# Patient Record
Sex: FEMALE | Race: WHITE | Employment: OTHER | URBAN - METROPOLITAN AREA
[De-identification: names, ages, dates, MRNs, and addresses within clinical notes are randomized per-mention and may not be internally consistent; named-entity substitution may affect disease eponyms.]

---

## 2017-05-15 ENCOUNTER — HOSPITAL ENCOUNTER (OUTPATIENT)
Dept: CT IMAGING | Age: 77
Discharge: HOME | End: 2017-05-15
Payer: SELF-PAY

## 2017-05-15 DIAGNOSIS — Z13.6 SCREENING FOR ISCHEMIC HEART DISEASE: ICD-10-CM

## 2017-05-15 PROCEDURE — 75571 CT HRT W/O DYE W/CA TEST: CPT

## 2017-05-23 ENCOUNTER — TELEPHONE (OUTPATIENT)
Dept: CARDIAC REHAB | Age: 77
End: 2017-05-23

## 2017-05-23 NOTE — TELEPHONE ENCOUNTER
Called patient to review CT scan results. Verified patient's date of birth. Discussed results of CT scan. Her calcium score is 0. This corresponds to no plaque noted in the coronary arteries. Her risk for a heart attack is very low. The chance of patient developing heart disease at this point is less than 1%. Patient verbalized understanding of results. Will fax report to his/her PCP, Miriam Martínez.

## 2019-05-05 PROBLEM — C50.919 MALIGNANT NEOPLASM OF BREAST IN FEMALE, ESTROGEN RECEPTOR NEGATIVE (HCC): Status: ACTIVE | Noted: 2019-05-05

## 2019-05-05 PROBLEM — Z17.1 MALIGNANT NEOPLASM OF BREAST IN FEMALE, ESTROGEN RECEPTOR NEGATIVE (HCC): Status: ACTIVE | Noted: 2019-05-05

## 2019-05-05 PROBLEM — M85.88 OSTEOPENIA OF SPINE: Status: ACTIVE | Noted: 2019-05-05

## 2019-05-05 RX ORDER — MELOXICAM 15 MG/1
1 TABLET ORAL DAILY
Refills: 0 | COMMUNITY
Start: 2019-03-25

## 2019-05-05 RX ORDER — LEVOTHYROXINE SODIUM 50 UG/1
50 TABLET ORAL DAILY
COMMUNITY

## 2019-05-07 ENCOUNTER — OFFICE VISIT (OUTPATIENT)
Dept: INTERNAL MEDICINE CLINIC | Age: 79
End: 2019-05-07

## 2019-05-07 VITALS
OXYGEN SATURATION: 96 % | DIASTOLIC BLOOD PRESSURE: 65 MMHG | SYSTOLIC BLOOD PRESSURE: 113 MMHG | BODY MASS INDEX: 22.08 KG/M2 | WEIGHT: 120 LBS | HEIGHT: 62 IN | RESPIRATION RATE: 18 BRPM | TEMPERATURE: 97.6 F | HEART RATE: 75 BPM

## 2019-05-07 DIAGNOSIS — E03.9 ACQUIRED HYPOTHYROIDISM: ICD-10-CM

## 2019-05-07 DIAGNOSIS — E78.5 HYPERLIPIDEMIA, UNSPECIFIED HYPERLIPIDEMIA TYPE: ICD-10-CM

## 2019-05-07 DIAGNOSIS — R39.9 LOWER URINARY TRACT SYMPTOMS (LUTS): ICD-10-CM

## 2019-05-07 DIAGNOSIS — Z12.11 SCREENING FOR COLON CANCER: ICD-10-CM

## 2019-05-07 DIAGNOSIS — Z17.1 MALIGNANT NEOPLASM OF RIGHT BREAST IN FEMALE, ESTROGEN RECEPTOR NEGATIVE, UNSPECIFIED SITE OF BREAST (HCC): ICD-10-CM

## 2019-05-07 DIAGNOSIS — C50.911 MALIGNANT NEOPLASM OF RIGHT BREAST IN FEMALE, ESTROGEN RECEPTOR NEGATIVE, UNSPECIFIED SITE OF BREAST (HCC): ICD-10-CM

## 2019-05-07 DIAGNOSIS — M85.88 OSTEOPENIA OF SPINE: Primary | ICD-10-CM

## 2019-05-07 DIAGNOSIS — R07.89 ATYPICAL CHEST PAIN: ICD-10-CM

## 2019-05-07 DIAGNOSIS — K22.70 BARRETT'S ESOPHAGUS WITHOUT DYSPLASIA: ICD-10-CM

## 2019-05-07 PROBLEM — R73.03 PREDIABETES: Status: ACTIVE | Noted: 2019-05-07

## 2019-05-07 PROBLEM — K80.20 CALCULUS OF GALLBLADDER WITHOUT CHOLECYSTITIS WITHOUT OBSTRUCTION: Status: ACTIVE | Noted: 2019-05-07

## 2019-05-07 PROBLEM — E78.49 OTHER HYPERLIPIDEMIA: Status: ACTIVE | Noted: 2019-05-07

## 2019-05-07 RX ORDER — LANOLIN ALCOHOL/MO/W.PET/CERES
1 CREAM (GRAM) TOPICAL DAILY
COMMUNITY

## 2019-05-07 RX ORDER — MULTIVITAMIN
1 TABLET ORAL DAILY
COMMUNITY

## 2019-05-07 RX ORDER — ACETAMINOPHEN 500 MG
1 TABLET ORAL DAILY
COMMUNITY

## 2019-05-07 RX ORDER — OMEPRAZOLE 20 MG/1
1 CAPSULE, DELAYED RELEASE ORAL 2 TIMES DAILY
Refills: 0 | COMMUNITY
Start: 2019-03-24

## 2019-05-07 NOTE — PROGRESS NOTES
HPI:  
 
This lady presents to the office for transfer of care and brought in the a medical summary sheet from the old practice which was used as template for her medical history. Her medications were reviewed and reconciled. She reports evaluation by Dr. Bakari Walsh for right knee pain prior to her overseas trip and was advised she had a degenerative meniscal tear which is being treated conservatively. She was able to walk during her vacation without difficulty. She is using Mobic for pain. 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 Gerald Naqvi and advised her to switch from Ranitidine to Omeprazole. There was concern about vitamin and nutrient malabsorption with prolonged use and was advised to take calcium supplements. I told her that it would be best for her to get calcium from her diet in light of NIH study showing increased CV risk with use of calcium supplements Her GYN screening is current through Dr. Isaura Eddy and continues to be followed by Dr. Deshaun Chamberlain at [facility] and Dr. Latoya Winters locally for her triple negative breast cancer diagnosed in  September 2013. This was treated with right breast lumpectomy with oncoplasty and SNL followed by XRT and TAC with treatment completed in July 2014. She has documented HLP with high ACC/AHA risk score but declined statin therapy. CAC scoring could not be scheduled because she has exceeded age limit for this testing although she has evidence for mild LICA plaquing and mild ischemic white matter changes. She denies any exertional chest pain but has occasional fluttering in her chest with exertion. She has osteopenia and is taking D3 supplements and exercising regularly. Her last DEXA showed further decline in BMD of her hip with follow up recommended in 2 years. She is on thyroid replacement for hypothyroidism and last TSH in April 2018 was 3.02. I cannot find any record of prior TPO antibody testing. She continues to work as a  and claims that her memory continues to serve her well. The last Folstein testing on file from 2018 was normal. 
 
Past Medical History:  
Diagnosis Date  Ramos esophagus   
 without dysplasia  GERD (gastroesophageal reflux disease)  Hypothyroidism  Macrocytosis without anemia  Malignant neoplasm of breast in female, estrogen receptor negative ([facility] [address] 2019 Triple negative breast cancer right  Osteopenia of spine 2019 Past Surgical History:  
Procedure Laterality Date  HX APPENDECTOMY  HX BREAST LUMPECTOMY  HX  SECTION Current Outpatient Medications Medication Sig  levothyroxine (SYNTHROID) 50 mcg tablet Take 50 mcg by mouth daily.  meloxicam (MOBIC) 15 mg tablet Take 1 Tab by mouth daily. No current facility-administered medications for this visit. Allergies and Intolerances:  
No Known Allergies Family History:  
Family History Problem Relation Age of Onset  Heart Disease Mother  No Known Problems Father Social History: She  reports that she has never smoked. She has never used smokeless tobacco.  
Social History Substance and Sexual Activity Alcohol Use Yes Immunization History:   Pneumovax, Prevnar, Shingrix, Flu  
 
Diet:                               Sensible Exercise:                        Elliptical, Biking, Weight Home Environment:       3 story home Occupational Risk:         No hazards Review of Systems Constitutional: Negative for chills, fever and weight loss. HENT: Negative for hearing loss. Respiratory: Negative for cough and shortness of breath. Cardiovascular: Positive for palpitations. Gastrointestinal: Negative for blood in stool, heartburn and melena. Genitourinary: Positive for frequency. Musculoskeletal: Positive for joint pain. Skin: Negative for rash. Neurological: Negative for dizziness and headaches. Endo/Heme/Allergies: Positive for environmental allergies. Does not bruise/bleed easily. Psychiatric/Behavioral: The patient does not have insomnia. Physical:  
Visit Vitals /65 (BP 1 Location: Left arm, BP Patient Position: Sitting) Pulse 75 Temp 97.6 °F (36.4 °C) (Oral) Resp 18 Ht 5' 2\" (1.575 m) Wt 120 lb (54.4 kg) SpO2 96% Breastfeeding? Unknown BMI 21.95 kg/m² Physical Exam  
Constitutional:  
Highly anxious lady HENT:  
Right Ear: External ear normal.  
Left Ear: External ear normal.  
Mouth/Throat: Oropharynx is clear and moist.  
Eyes: Conjunctivae are normal. No scleral icterus. Neck: No JVD present. No thyromegaly present. Cardiovascular: Normal rate, regular rhythm and normal heart sounds. Exam reveals no gallop. No murmur heard. Pulmonary/Chest: Breath sounds normal. She has no wheezes. She has no rales. Abdominal: Soft. Bowel sounds are normal. She exhibits no mass. There is no tenderness. Musculoskeletal: She exhibits no edema or deformity. Mild instability to medial and lateral stress testing right knee, tight hip flexors Lymphadenopathy:  
  She has no cervical adenopathy. Neurological: She is alert. She has intact cranial nerves. She displays no tremor. Gait normal.  
Skin: Skin is warm. No cyanosis or ulcer, nail polish obscured details Vitals reviewed. Review of Data:   EKG with background artifact, NSR without Q waves or ST changes Labs: 
No visits with results within 3 Month(s) from this visit. Latest known visit with results is: No results found for any previous visit. Impression/Plan: 
 Patient Active Problem List  
Diagnosis  Code GERD (silent) with Ramos's metaplasia PPI, endoscopic surveillance per Dr. Berenice Hui K21.9, K22.70 Atypical chest discomfort Arrange NST through Dr. Deshawn Villa R07.89  Hyperlipidemia with mild atherosclerotic burden with statin declined Abide by patient's decision, consider use of Mozella Gil E78.5  Malignant neoplasm of breast, triple negative, S/P lumpectomy, SNL (1/2 +), XRT and TAC Surveillance and management per Dr. Clarisse Mckeon and Dr. Rois Posada 
 C50.919, Z17.1  Osteopenia of spine with further decline BMD Check Vitamin D level, weight bearing exercise, DEXA 2020, limit ETOH 
 M85.88  Hypothyroidism Avoid overreplacment E03.9  Meniscal tear right knee, degenerative type Management per Dr. Adelina Crawford, knee brace K22.70  Macrocytosis without anemia likely from ETOH use Limit ETOH use, check B12 D75.89  Screening/Wellness Cologard requested, Shingrix recommended E03.9 Donnell Oreilly MD

## 2019-05-07 NOTE — PROGRESS NOTES
Chief Complaint Patient presents with  Thyroid Problem 1. Have you been to the ER, urgent care clinic since your last visit? Hospitalized since your last visit? No 
 
2. Have you seen or consulted any other health care providers outside of the [address] [place] [address] since your last visit? Include any pap smears or colon screening.  No

## 2019-05-15 DIAGNOSIS — R07.89 ATYPICAL CHEST PAIN: Primary | ICD-10-CM

## 2019-05-15 LAB
25(OH)D3 SERPL-MCNC: 46.7 NG/ML (ref 32–100)
A-G RATIO,AGRAT: 1.8 RATIO (ref 1.1–2.6)
ALBUMIN SERPL-MCNC: 4.7 G/DL (ref 3.5–5)
ALP SERPL-CCNC: 88 U/L (ref 40–120)
ALT SERPL-CCNC: 16 U/L (ref 5–40)
ANION GAP SERPL CALC-SCNC: 17 MMOL/L
AST SERPL W P-5'-P-CCNC: 22 U/L (ref 10–37)
BILIRUB SERPL-MCNC: 0.9 MG/DL (ref 0.2–1.2)
BILIRUB UR QL: NEGATIVE
BUN SERPL-MCNC: 14 MG/DL (ref 6–22)
CALCIUM SERPL-MCNC: 10 MG/DL (ref 8.4–10.5)
CHLORIDE SERPL-SCNC: 98 MMOL/L (ref 98–110)
CHOLEST SERPL-MCNC: 226 MG/DL (ref 110–200)
CO2 SERPL-SCNC: 26 MMOL/L (ref 20–32)
CREAT SERPL-MCNC: 0.6 MG/DL (ref 0.8–1.4)
ERYTHROCYTE [DISTWIDTH] IN BLOOD BY AUTOMATED COUNT: 12.5 % (ref 10–15.5)
GFRAA, 66117: >60
GFRNA, 66118: >60
GLOBULIN,GLOB: 2.6 G/DL (ref 2–4)
GLUCOSE SERPL-MCNC: 101 MG/DL (ref 70–99)
GLUCOSE UR QL: NEGATIVE MG/DL
HCT VFR BLD AUTO: 44.2 % (ref 35.1–48.3)
HDLC SERPL-MCNC: 2.4 MG/DL (ref 0–5)
HDLC SERPL-MCNC: 95 MG/DL (ref 40–59)
HGB BLD-MCNC: 14.6 G/DL (ref 11.7–16.1)
HGB UR QL STRIP: NEGATIVE
KETONES UR QL STRIP.AUTO: NEGATIVE MG/DL
LDLC SERPL CALC-MCNC: 117 MG/DL (ref 50–99)
LEUKOCYTE ESTERASE: NEGATIVE
MCH RBC QN AUTO: 32 PG (ref 26–34)
MCHC RBC AUTO-ENTMCNC: 33 G/DL (ref 31–36)
MCV RBC AUTO: 96 FL (ref 80–95)
NITRITE UR QL STRIP.AUTO: NEGATIVE
PH UR STRIP: 6 PH (ref 5–8)
PLATELET # BLD AUTO: 223 K/UL (ref 140–440)
PMV BLD AUTO: 11.8 FL (ref 9–13)
POTASSIUM SERPL-SCNC: 4.7 MMOL/L (ref 3.5–5.5)
PROT SERPL-MCNC: 7.3 G/DL (ref 6.2–8.1)
PROT UR QL STRIP: NEGATIVE MG/DL
RBC # BLD AUTO: 4.63 M/UL (ref 3.8–5.2)
RBC #/AREA URNS HPF: NEGATIVE /HPF
SODIUM SERPL-SCNC: 141 MMOL/L (ref 133–145)
SP GR UR: 1.01 (ref 1–1.03)
TRIGL SERPL-MCNC: 71 MG/DL (ref 40–149)
TSH SERPL DL<=0.005 MIU/L-ACNC: 2.15 MCU/ML (ref 0.27–4.2)
UROBILINOGEN UR STRIP-MCNC: <2 MG/DL
VLDLC SERPL CALC-MCNC: 14 MG/DL (ref 8–30)
WBC # BLD AUTO: 7.1 K/UL (ref 4–11)
WBC URNS QL MICRO: NEGATIVE /HPF (ref 0–2)

## 2019-05-17 ENCOUNTER — TELEPHONE (OUTPATIENT)
Dept: INTERNAL MEDICINE CLINIC | Age: 79
End: 2019-05-17

## 2019-05-17 NOTE — TELEPHONE ENCOUNTER
Pt calling about the Nuclear stress test. Do you want her to have it done. If yes she wants Dr. Ndiaye Power office. We talked about this there was no order placed.

## 2019-06-03 DIAGNOSIS — R07.89 ATYPICAL CHEST PAIN: ICD-10-CM

## 2019-08-06 ENCOUNTER — TELEPHONE (OUTPATIENT)
Dept: INTERNAL MEDICINE CLINIC | Age: 79
End: 2019-08-06

## 2019-08-06 DIAGNOSIS — F41.9 ANXIETY: Primary | ICD-10-CM

## 2019-08-06 RX ORDER — LORAZEPAM 0.5 MG/1
0.5 TABLET ORAL
Qty: 30 TAB | Refills: 0 | Status: SHIPPED | OUTPATIENT
Start: 2019-08-06

## 2019-08-26 ENCOUNTER — TELEPHONE (OUTPATIENT)
Dept: INTERNAL MEDICINE CLINIC | Age: 79
End: 2019-08-26

## 2019-08-26 NOTE — TELEPHONE ENCOUNTER
Pt called requesting a recommendation to who to see a VBIM, she stated she has been having back issues for the past 2-3 weeks and would like to be able to see a specialist. She would like to schedule an appointment to see Dr. Tita Lan but he is completely booked until his leave date. Please advise.

## 2019-08-29 ENCOUNTER — HOSPITAL ENCOUNTER (OUTPATIENT)
Dept: LAB | Age: 79
Discharge: HOME | End: 2019-08-29
Payer: MEDICARE

## 2019-08-29 ENCOUNTER — OFFICE VISIT (OUTPATIENT)
Dept: INTERNAL MEDICINE CLINIC | Age: 79
End: 2019-08-29

## 2019-08-29 VITALS
OXYGEN SATURATION: 98 % | WEIGHT: 118 LBS | HEIGHT: 62 IN | BODY MASS INDEX: 21.71 KG/M2 | TEMPERATURE: 97.8 F | RESPIRATION RATE: 18 BRPM | DIASTOLIC BLOOD PRESSURE: 66 MMHG | SYSTOLIC BLOOD PRESSURE: 126 MMHG | HEART RATE: 66 BPM

## 2019-08-29 DIAGNOSIS — G89.29 CHRONIC BILATERAL LOW BACK PAIN WITHOUT SCIATICA: Primary | ICD-10-CM

## 2019-08-29 DIAGNOSIS — M54.50 CHRONIC LEFT-SIDED LOW BACK PAIN WITHOUT SCIATICA: ICD-10-CM

## 2019-08-29 DIAGNOSIS — G89.29 CHRONIC LEFT-SIDED LOW BACK PAIN WITHOUT SCIATICA: Primary | ICD-10-CM

## 2019-08-29 DIAGNOSIS — M54.50 CHRONIC BILATERAL LOW BACK PAIN WITHOUT SCIATICA: Primary | ICD-10-CM

## 2019-08-29 DIAGNOSIS — M54.50 CHRONIC LEFT-SIDED LOW BACK PAIN WITHOUT SCIATICA: Primary | ICD-10-CM

## 2019-08-29 DIAGNOSIS — G89.29 CHRONIC LEFT-SIDED LOW BACK PAIN WITHOUT SCIATICA: ICD-10-CM

## 2019-08-29 LAB
APPEARANCE UR: CLEAR
BACTERIA URNS QL MICRO: ABNORMAL /HPF
BILIRUB UR QL: NEGATIVE
COLOR UR: YELLOW
EPITH CASTS URNS QL MICRO: ABNORMAL /LPF (ref 0–5)
GLUCOSE UR STRIP.AUTO-MCNC: NEGATIVE MG/DL
HGB UR QL STRIP: NEGATIVE
KETONES UR QL STRIP.AUTO: NEGATIVE MG/DL
LEUKOCYTE ESTERASE UR QL STRIP.AUTO: NEGATIVE
NITRITE UR QL STRIP.AUTO: NEGATIVE
PH UR STRIP: 6 [PH] (ref 5–8)
PROT UR STRIP-MCNC: NEGATIVE MG/DL
RBC #/AREA URNS HPF: ABNORMAL /HPF (ref 0–5)
SP GR UR REFRACTOMETRY: 1.01 (ref 1–1.03)
UROBILINOGEN UR QL STRIP.AUTO: 0.2 EU/DL (ref 0.2–1)
WBC URNS QL MICRO: ABNORMAL /HPF (ref 0–4)

## 2019-08-29 PROCEDURE — 81001 URINALYSIS AUTO W/SCOPE: CPT

## 2019-08-29 NOTE — PROGRESS NOTES
HPI:     This lady presents to the office with month long history of left lower back pain that is intermittent and provoked by certain movement such as getting up from a sitting position. This is relieved by bending over at the waist with ankles crossed. The pain is non radiating and not associated with paresthesias or leg weakness. The pain improves with ice compress and Advil. Her biggest concern is possibility of osseous metastases from underlying breast cancer. Past Medical History:   Diagnosis Date    Ramos esophagus     without dysplasia    GERD (gastroesophageal reflux disease)     Hypothyroidism     Macrocytosis without anemia     Malignant neoplasm of breast in female, estrogen receptor negative ([facility] [address] 2019    Triple negative breast cancer right     Osteopenia of spine 2019    Prediabetes 2019     Past Surgical History:   Procedure Laterality Date    HX APPENDECTOMY      HX BREAST LUMPECTOMY      HX  SECTION       Current Outpatient Medications   Medication Sig    LORazepam (ATIVAN) 0.5 mg tablet Take 1 Tab by mouth nightly as needed for Anxiety. Max Daily Amount: 0.5 mg.    cinnamon bark 500 mg cap Take 1 Cap by mouth daily.  cyanocobalamin (VITAMIN B-12) 1,000 mcg tablet Take 1 Tab by mouth daily.  cholecalciferol (VITAMIN D3) 2,000 unit cap capsule Take 1 Cap by mouth daily.  omeprazole (PRILOSEC) 20 mg capsule Take 1 Cap by mouth two (2) times a day.  levothyroxine (SYNTHROID) 50 mcg tablet Take 50 mcg by mouth daily.  meloxicam (MOBIC) 15 mg tablet Take 1 Tab by mouth daily. No current facility-administered medications for this visit. Allergies and Intolerances:   No Known Allergies     Family History:   Family History   Problem Relation Age of Onset    Heart Disease Mother 80    Other Father 80    Diabetes Daughter     No Known Problems Brother     No Known Problems Brother      Social History:   She  reports that she has never smoked. She has never used smokeless tobacco.   Social History     Substance and Sexual Activity   Alcohol Use Yes       Physical:   Visit Vitals  /66   Pulse 66   Temp 97.8 °F (36.6 °C) (Oral)   Resp 18   Ht 5' 2\" (1.575 m)   Wt 118 lb (53.5 kg)   SpO2 98%   BMI 21.58 kg/m²          Physical Exam   Constitutional: She is well-developed, well-nourished, and in no distress. Neck: No JVD present. Cardiovascular: Regular rhythm. Pulses:       Dorsalis pedis pulses are 2+ on the right side, and 2+ on the left side. Posterior tibial pulses are 2+ on the right side, and 2+ on the left side. Musculoskeletal:        Back:    Lymphadenopathy:     She has no cervical adenopathy. Neurological: She has normal strength. Gait normal.   Reflex Scores:       Patellar reflexes are 0 on the right side and 1+ on the left side. Achilles reflexes are 1+ on the right side and 1+ on the left side. Review of Data:   LS spine with scoliosis and DJD  Labs:  No visits with results within 3 Month(s) from this visit.    Latest known visit with results is:   Orders Only on 05/15/2019   Component Date Value Ref Range Status    WBC 05/15/2019 7.1  4.0 - 11.0 K/uL Final    RBC 05/15/2019 4.63  3.80 - 5.20 M/uL Final    HGB 05/15/2019 14.6  11.7 - 16.1 g/dL Final    HCT 05/15/2019 44.2  35.1 - 48.3 % Final    MCV 05/15/2019 96* 80 - 95 fL Final    MCH 05/15/2019 32  26 - 34 pg Final    MCHC 05/15/2019 33  31 - 36 g/dL Final    RDW 05/15/2019 12.5  10.0 - 15.5 % Final    PLATELET 93/56/5413 579  140 - 440 K/uL Final    MPV 05/15/2019 11.8  9.0 - 13.0 fL Final    Specific Gravity 05/15/2019 1.006  1.005 - 1.03 Final    pH (UA) 05/15/2019 6.0  5.0 - 8.0 pH Final    Protein 05/15/2019 Negative  Negative, mg/dL Final    Glucose 05/15/2019 Negative  Negative mg/dL Final    Ketone 05/15/2019 Negative  Negative, mg/dL Final    Bilirubin 05/15/2019 Negative  Negative Final    Blood 05/15/2019 Negative  Negative Final    Nitrites 05/15/2019 Negative  Negative Final    Leukocyte Esterase 05/15/2019 Negative  Negative Final    Urobilinogen 05/15/2019 <2.0  <2.0 mg/dL Final    WBC 05/15/2019 Negative  0 - 2 /hpf Final    RBC 05/15/2019 Negative  Negative, /hpf Final    TSH 05/15/2019 2.15  0.27 - 4.20 mcU/mL Final    Glucose 05/15/2019 101* 70 - 99 mg/dL Final    BUN 05/15/2019 14  6 - 22 mg/dL Final    Creatinine 05/15/2019 0.6* 0.8 - 1.4 mg/dL Final    Sodium 05/15/2019 141  133 - 145 mmol/L Final    Potassium 05/15/2019 4.7  3.5 - 5.5 mmol/L Final    Chloride 05/15/2019 98  98 - 110 mmol/L Final    CO2 05/15/2019 26  20 - 32 mmol/L Final    AST (SGOT) 05/15/2019 22  10 - 37 U/L Final    ALT (SGPT) 05/15/2019 16  5 - 40 U/L Final    Alk.  phosphatase 05/15/2019 88  40 - 120 U/L Final    Bilirubin, total 05/15/2019 0.9  0.2 - 1.2 mg/dL Final    Calcium 05/15/2019 10.0  8.4 - 10.5 mg/dL Final    Protein, total 05/15/2019 7.3  6.2 - 8.1 g/dL Final    Albumin 05/15/2019 4.7  3.5 - 5.0 g/dL Final    A-G Ratio 05/15/2019 1.8  1.1 - 2.6 ratio Final    Globulin 05/15/2019 2.6  2.0 - 4.0 g/dL Final    Anion gap 05/15/2019 17.0  mmol/L Final    GFRAA 05/15/2019 >60.0  >60.0 Final    GFRNA 05/15/2019 >60.0  >60.0 Final    Triglyceride 05/15/2019 71  40 - 149 mg/dL Final    HDL Cholesterol 05/15/2019 95* 40 - 59 mg/dL Final    Cholesterol, total 05/15/2019 226* 110 - 200 mg/dL Final    CHOLESTEROL/HDL 05/15/2019 2.4  0.0 - 5.0 Final    LDL, calculated 05/15/2019 117* 50 - 99 mg/dL Final    VLDL, calculated 05/15/2019 14  8 - 30 mg/dL Final    VITAMIN D, 25-HYDROXY 05/15/2019 46.7  32.0 - 100.0 ng/mL Final         Impression/{Plan:   Patient Active Problem List   Diagnosis  Code    Back pain likely myofascial Awaiting official LS spine report, bone scan, U/A, Advil, Ice, Stretching M54.5       Shay Perez MD

## 2019-08-29 NOTE — PROGRESS NOTES
Chief Complaint   Patient presents with    Back Pain     1. Have you been to the ER, urgent care clinic since your last visit? Hospitalized since your last visit? No    2. Have you seen or consulted any other health care providers outside of the [address] [place] [address] since your last visit? Include any pap smears or colon screening.  No

## 2019-08-30 ENCOUNTER — TELEPHONE (OUTPATIENT)
Dept: INTERNAL MEDICINE CLINIC | Age: 79
End: 2019-08-30

## 2020-02-20 ENCOUNTER — HOSPITAL ENCOUNTER (OUTPATIENT)
Dept: PHYSICAL THERAPY | Age: 80
Discharge: HOME | End: 2020-02-20
Payer: MEDICARE

## 2020-02-20 PROCEDURE — 97535 SELF CARE MNGMENT TRAINING: CPT

## 2020-02-20 PROCEDURE — 97161 PT EVAL LOW COMPLEX 20 MIN: CPT

## 2020-02-20 PROCEDURE — 97110 THERAPEUTIC EXERCISES: CPT

## 2020-02-20 NOTE — PROGRESS NOTES
[facility] PHYSICAL THERAPY AT [facility] [address] [place] [facility] [address] - Phone: [phone]  Fax: [phone] / [address] [facility]  Patient Name: Nitin Tate : 1940   Medical   Diagnosis: Low back pain [M54.5] Treatment Diagnosis: LBP   Onset Date:      Referral Source: Sravanthi Block MD [place] [facility]): 2020   Prior Hospitalization: See medical history Provider #: [id number]   Prior Level of Function: Symptom free, working out regularly at the gym   Comorbidities: Thyroid problems, Breast Cancer in 2013, Scoliosis   Medications: Verified on Patient Summary List     The Plan of Care and following information is based on the information from the initial evaluation.   ===========================================================================================  Assessment / key information:   Nitin Tate is a 78 y.o.  yo female with Dx of Low back pain [M54.5]. She currently rates LBP as 7/10 at worst, 3/10 at best, primarily located at L lower thoracic and lumbar region. She complains of difficulty and increase pain with amb > 5 min, standing> 10 min and during transfers. She reports no known trauma, unknown etiology of symptoms. Objective Findings:  Lumbar ROM: Flx  =24 inch from floor with reduction in symptoms, Ext =25% with pain, Lat Flx; R =reduction in symptoms, 50%L = 25% and increase symptoms, Rot: R =functional and nonpainful, L painful at onset of movement and 25%. Manual Muscle Testing: Limited hip flexion L 4/5, hip abd 3+/5 and core strength at fair with pain during testing. Special Test: SLS L with pain and inability to hold > 3 sec, R at 10 sec. Slump Test:  -, SLR -. Pt PIVM testing indicates hypomobility lower thoracic and lumbar L at Gr 2. Pt alignment: R lumbar rotation and L SB. Pt muscle length testing: + for hip flexor, L QL and piriformis restrictions.  Pt Tuberculin Skin Test   WHAT YOU SHOULD KNOW:   · A tuberculin skin test is done to see if you are infected with the germ that causes tuberculosis (TB)  Tuberculin is a liquid that caregivers inject into the skin of your arm  Your skin will react to tuberculin if you are infected  Tuberculosis is a serious infection that usually starts in the lungs  TB germs are easily spread from one person to another through the air  The germs can live in your body a long time without making you sick  This is called latent TB  Latent TB can develop into active TB if it is not treated  · A TB skin test may be done if you live or have spent time with someone who has TB  The test is often done if you have signs of active TB  For example, you cough up blood or have night sweats  You may need to be tested if you share needles to inject drugs  A TB test may also be needed as screening for a job or if your immune system is weak from disease  AFTER YOU LEAVE:   After the test:   · Return in 2 to 3 days: Your skin must be checked 2 to 3 days after the test  You will need another TB skin test if you do not come back within 3 days  · Watch for signs of allergic reaction:  Some people have an allergic reaction to tuberculin  Seek help immediately if you have any symptoms of allergic reaction, such as hives or swelling  What the test results mean:  The TB skin test can only show that you were infected with the germ that causes tuberculosis  You will need more tests to learn if you have latent or active TB  The most common tests are chest x-rays and sputum samples  · Positive test:  Your test is positive if the area around the skin test is raised or hard  Your test can be positive even if you do not have active TB  This would happen if you received a BCG vaccination for TB  · Negative test:  Your test is negative if there is no change to your skin  Your test can be negative even if you do have TB   Your immune system may be too instructed in HEP and will f/u in clinic for PT.      ==================================================================================  Eval Complexity: History MEDIUM  Complexity : 1-2 comorbidities / personal factors will impact the outcome/ POC ;  Examination  MEDIUM Complexity : 3 Standardized tests and measures addressing body structure, function, activity limitation and / or participation in recreation ; Presentation LOW Complexity : Stable, uncomplicated ;  Decision Making MEDIUM Complexity : FOTO score of 26-74; Overall Complexity LOW   Problem List: pain affecting function, decrease ROM, decrease strength, impaired gait/ balance, decrease ADL/ functional abilitiies, decrease activity tolerance, decrease flexibility/ joint mobility and decrease transfer abilities   Treatment Plan may include any combination of the following: Therapeutic exercise, Therapeutic activities, Neuromuscular re-education, Physical agent/modality, Manual therapy, Patient education and Self Care training  Patient / Family readiness to learn indicated by: asking questions, trying to perform skills and interest  Persons(s) to be included in education: patient (P)  Barriers to Learning/Limitations: None  Measures taken, if barriers to learning:    Patient Goal (s): Less pain and able to work with less limitations     Rehabilitation Potential: good   Short Term Goals: To be accomplished in  2  weeks:  1. Pt will be independent with HEP for strengthening, stretching program to improve ability to perform ADLs. 2. Pt will report reduction in max pain <3/10 to assist with return to full work activity. 3. Improve FOTO score by 5 points to improve function with squatting, sit<>stand and amb. 4.  Pt will demo understanding of correct body mechanics and postural re-ed to improve symptoms.  Long Term Goals: To be accomplished in  4  Weeks:  1. Pt will demo understanding of and compliance with DC HEP to improve symptoms.   2.  Pt will weak to react to the tuberculin, or your infection may be too recent  Contact your primary healthcare provider if:   · You have questions or concerns about the test or about tuberculosis  Seek care immediately or call 911 if:  · You have chest pain  · You have a mild cough that gets worse  You may cough up white, yellow blood-streaked sputum  · You have blisters, ulcers, or your skin has turned black in the injection area  · Your skin is itchy, or you have a rash or hives that are spreading  · Your face is red and swollen  · Your mouth is swollen, your throat feels tight, or you have trouble breathing  © 2014 [address] is for End User's use only and may not be sold, redistributed or otherwise used for commercial purposes  All illustrations and images included in CareNotes® are the copyrighted property of [employer]  or [employer]  The above information is an  only  It is not intended as medical advice for individual conditions or treatments  Talk to your doctor, nurse or pharmacist before following any medical regimen to see if it is safe and effective for you  demonstrate ability to perform amb in community 30 min with 3/10 LBP. 3.  Pt will report +4 on GROC scale to indicate improved function and be prepared to DC to HEP. Frequency / Duration:   Patient to be seen  2 times per week for 4weeks:  Patient / Caregiver education and instruction: self care, activity modification and exercises    Therapist Signature: Quirino Hassan PT Date: 3/74/2736   Certification Period: 2/20/20-3/19/20 Time: 9:12 AM   ===========================================================================================  I certify that the above Physical Therapy Services are being furnished while the patient is under my care. I agree with the treatment plan and certify that this therapy is necessary. Physician Signature:        Date:       Time:     Please sign and return to [facility] or you may fax the signed copy to [phone]. Thank you.

## 2020-02-20 NOTE — PROGRESS NOTES
PHYSICAL THERAPY - DAILY TREATMENT NOTE    Patient Name: Olivia Feng        Date: 2020  : 1940   YES Patient  Verified  Visit #:   1   of   8  Insurance: Payor: Rosette Fortune / Plan: [place] MED[place]ARE PART A & B / Product Type: Medicare /      In time: 910 Out time:    Total Treatment Time: 58     Medicare Time Tracking (below)   Total Timed Codes (min):  30 1:1 Treatment Time:  30     TREATMENT AREA =  Low back pain [M54.5]    SUBJECTIVE                                                         See IE            OBJECTIVE       15 min Therapeutic Exercise:  []  See flow sheet   Rationale:      increase ROM and increase strength to improve the patients ability to perform ADLs, IADLs with less pain      15 min Self-Care      Rationale:     Improve posture and body mechanics to improve the patients ability to perform ADLs, IADLs, work with less pain        Billed With/As:   [x] TE   [] TA   [] Neuro   [x] Self Care Patient Education: [x] Review HEP    [] Progressed/Changed HEP based on:   [] positioning   [] body mechanics   [] transfers   [] heat/ice application    [] other:        Other Objective/Functional Measures:                                   See IE       ASSESSMENT    [x]  See Initial Evaluation     PLAN    []  Upgrade activities as tolerated YES Continue plan of care   []  Discharge due to :    []  Other: Pt will return for follow up and to initiate POC     Therapist: Silviano Lopez PT, DPT, MTC    Date: 2020 Time: 9:12 AM

## 2020-02-24 ENCOUNTER — HOSPITAL ENCOUNTER (OUTPATIENT)
Dept: PHYSICAL THERAPY | Age: 80
Discharge: HOME | End: 2020-02-24
Payer: MEDICARE

## 2020-02-24 PROCEDURE — 97110 THERAPEUTIC EXERCISES: CPT

## 2020-02-24 PROCEDURE — 97140 MANUAL THERAPY 1/> REGIONS: CPT

## 2020-02-24 NOTE — PROGRESS NOTES
PHYSICAL THERAPY - DAILY TREATMENT NOTE     Patient Name: Tomi Peraza        Date: 2020  : 1940   YES Patient  Verified  Visit #:   2   of   8  Insurance: Payor: Marlena Borne / Plan: [place] MED[place]ARE PART A & B / Product Type: Medicare /      In time: 7405 Out time: 105   Total Treatment Time: 60     Medicare/BCBS Time Tracking (below)   Total Timed Codes (min):  50 1:1 Treatment Time:  40     TREATMENT AREA =  Low back pain [M54.5]    SUBJECTIVE    Pain Level (on 0 to 10 scale):  2  / 10   Medication Changes/New allergies or changes in medical history, any new surgeries or procedures? NO    If yes, update Summary List   Subjective Functional Status/Changes:  []  No changes reported       Functional improvements: Did her exercises, but not noticing improvement yet in function.   Functional impairments: work, ADLs, IADLs with  pain           OBJECTIVE  Modalities Rationale:     decrease pain to improve patient's ability to perform ADLs, IADLs with less pain        min [] Estim, type/location:                                      []  att     []  unatt     []  w/US     []  w/ice    []  w/heat    min []  Mechanical Traction: type/lbs                   []  pro   []  sup   []  int   []  cont    []  before manual    []  after manual    min []  Ultrasound, settings/location:      min []  Iontophoresis w/ dexamethasone, location:                                               []  take home patch       []  in clinic   10 min []  Ice     [x]  Heat    location/position: Supine to lumbar    min []  Vasopneumatic Device, press/temp:     min []  Other:    [x] Skin assessment post-treatment (if applicable):    [x]  intact    [x]  redness- no adverse reaction     []redness - adverse reaction:      35/25 min Therapeutic Exercise:  [x]  See flow sheet   Rationale:      increase ROM, increase strength and improve coordination to improve the patients ability to perform ADLs, IADLs with less pain       15 min Manual Therapy: Technique:      [x] S/DTM []IASTM []PROM [] Passive Stretching   [x]manual TPR    []Jt manipulation:Gr I [] II []  III [] IV[] V[]  Treatment Area:  L QL, L lumbar paraspinals   Rationale:      decrease pain, increase ROM and increase tissue extensibility to improve patient's ability to perform ADLs, IADLs with less pain         Billed With/As:   [x] TE   [] TA   [] Neuro   [] Self Care Patient Education: [x] Review HEP    [] Progressed/Changed HEP based on:   [x] positioning   [x] body mechanics   [x] transfers   [] heat/ice application    [] other:        Other Objective/Functional Measures:    Initiated HEP and there ex. Post Treatment Pain Level (on 0 to 10) scale:   2  / 10     ASSESSMENT    Assessment/Changes in Function:     Good response to updated HEP. Pt with significant difficulty performing S/L hip abd with LBP and compensation. Modified to clam for HEP. []  See Progress Note/Recertification   Patient will continue to benefit from skilled PT services to modify and progress therapeutic interventions, address functional mobility deficits, address ROM deficits, address strength deficits, analyze and address soft tissue restrictions, analyze and cue movement patterns and analyze and modify body mechanics/ergonomics to attain remaining goals. Progress toward goals / Updated goals:  1. Pt will be independent with HEP for strengthening, stretching program to improve ability to perform ADLs--fair progress with min A for verbal and tactile cues.   2. Pt will report reduction in max pain <3/10 to assist with return to full work activity--fair progress with 5/10        PLAN    [x]  Upgrade activities as tolerated yes Continue plan of care   []  Discharge due to :    []  Other:      Therapist: Claudette Gray, PT    Date: 2/24/2020 Time: 12:12 PM     Future Appointments   Date Time Provider Deanna Yanez   2/28/2020  9:30 AM Domingo Fregoso, PT REHAB CENTER AT [facility]   3/2/2020 12:00 PM Domingo Fregoso, PT REHAB CENTER AT [facility]   3/6/2020 11:00 AM Cyrilla December, PT REHAB CENTER AT [facility]   3/9/2020 10:00 AM Cyrilla December, PT REHAB CENTER AT [facility]   3/13/2020  9:30 AM Cyrilla December, PT REHAB CENTER AT [facility]   3/18/2020 11:30 AM Cyrilla December, PT REHAB CENTER AT [facility]   3/23/2020 10:00 AM Cyrilla December, PT REHAB CENTER AT [facility]

## 2020-02-28 ENCOUNTER — HOSPITAL ENCOUNTER (OUTPATIENT)
Dept: PHYSICAL THERAPY | Age: 80
Discharge: HOME | End: 2020-02-28
Payer: MEDICARE

## 2020-02-28 PROCEDURE — 97140 MANUAL THERAPY 1/> REGIONS: CPT

## 2020-02-28 PROCEDURE — 97110 THERAPEUTIC EXERCISES: CPT

## 2020-02-28 NOTE — PROGRESS NOTES
PHYSICAL THERAPY - DAILY TREATMENT NOTE     Patient Name: Olivia Feng        Date: 2020  : 1940   YES Patient  Verified  Visit #:   3   of   8  Insurance: Payor: Rosette Tong / Plan: [place] MEDICARE PART A & B / Product Type: Medicare /      In time: 930 Out time:  1562   Total Treatment Time: 65     Medicare/BCBS Time Tracking (below)   Total Timed Codes (min):  55 1:1 Treatment Time:  55     TREATMENT AREA =  Low back pain [M54.5]    SUBJECTIVE    Pain Level (on 0 to 10 scale):  2  / 10   Medication Changes/New allergies or changes in medical history, any new surgeries or procedures? NO    If yes, update Summary List   Subjective Functional Status/Changes:  []  No changes reported       Functional improvements: Did her exercises since last visit but worked on bed vs floor to reduce pressure.   Functional impairments: Standing for work, ADLs, IADLs with  pain           OBJECTIVE  Modalities Rationale:     decrease pain to improve patient's ability to perform ADLs, IADLs with less pain   min [] Estim, type/location:                                      []  att     []  unatt     []  w/US     []  w/ice    []  w/heat    min []  Mechanical Traction: type/lbs                   []  pro   []  sup   []  int   []  cont    []  before manual    []  after manual    min []  Ultrasound, settings/location:      min []  Iontophoresis w/ dexamethasone, location:                                               []  take home patch       []  in clinic   10 min []  Ice     [x]  Heat    location/position: Supine to lumbar    min []  Vasopneumatic Device, press/temp:     min []  Other:    [x] Skin assessment post-treatment (if applicable):    [x]  intact    [x]  redness- no adverse reaction     []redness - adverse reaction:      40 min Therapeutic Exercise:  [x]  See flow sheet   Rationale:      increase ROM, increase strength and improve coordination to improve the patients ability to perform ADLs, IADLs with less pain 15 min Manual Therapy: Technique:      [x] S/DTM []IASTM []PROM [] Passive Stretching   [x]manual TPR    []Jt manipulation:Gr I [] II []  III [] IV[] V[]  Treatment Area:  B QL L>R, L lumbar paraspinals   Rationale:      decrease pain, increase ROM and increase tissue extensibility to improve patient's ability to perform ADLs, IADLs with less pain         Billed With/As:   [x] TE   [] TA   [] Neuro   [] Self Care Patient Education: [x] Review HEP    [] Progressed/Changed HEP based on:   [x] positioning   [x] body mechanics   [x] transfers   [] heat/ice application    [] other:        Other Objective/Functional Measures:    Advanced HEP and there ex with bridges, SLR and dead bug. Added BKFO with band and increased reps for core and LE strengthening. Post Treatment Pain Level (on 0 to 10) scale:   2  / 10     ASSESSMENT    Assessment/Changes in Function:     Pt with ability to perform pain free bridge and SLR. She was unable to perform at time of IEval, good progress. []  See Progress Note/Recertification   Patient will continue to benefit from skilled PT services to modify and progress therapeutic interventions, address functional mobility deficits, address ROM deficits, address strength deficits, analyze and address soft tissue restrictions, analyze and cue movement patterns and analyze and modify body mechanics/ergonomics to attain remaining goals. Progress toward goals / Updated goals:  1. Pt will be independent with HEP for strengthening, stretching program to improve ability to perform ADLs--met.   2. Pt will report reduction in max pain <3/10 to assist with return to full work activity--fair progress with 3-4/10        PLAN    [x]  Upgrade activities as tolerated yes Continue plan of care   []  Discharge due to :    []  Other:      Therapist: Rusty Rao PT    Date: 2/28/2020 Time: 1 PM     Future Appointments   Date Time Provider Deanna Yanez   3/2/2020 12:00 PM Chad Mccartney, PT REHAB CENTER AT [facility]   3/6/2020 11:00 AM Akilah Board, PT REHAB CENTER AT [facility]   3/9/2020 10:00 AM Akilah Board, PT REHAB CENTER AT [facility]   3/13/2020  9:30 AM Akilah Board, PT REHAB CENTER AT [facility]   3/18/2020 11:30 AM Akilah Board, PT REHAB CENTER AT [facility]   3/23/2020 10:00 AM Akilah Board, PT REHAB CENTER AT [facility]

## 2020-03-02 ENCOUNTER — HOSPITAL ENCOUNTER (OUTPATIENT)
Dept: PHYSICAL THERAPY | Age: 80
Discharge: HOME | End: 2020-03-02
Payer: MEDICARE

## 2020-03-02 PROCEDURE — 97140 MANUAL THERAPY 1/> REGIONS: CPT

## 2020-03-02 PROCEDURE — 97110 THERAPEUTIC EXERCISES: CPT

## 2020-03-02 NOTE — PROGRESS NOTES
PHYSICAL THERAPY - DAILY TREATMENT NOTE     Patient Name: Eren Gregg        Date: 3/2/2020  : 1940   YES Patient  Verified  Visit #:     Insurance: Payor: Orly Ward / Plan: [place] MEDICARE PART A & B / Product Type: Medicare /      In time: 12 Out time: 1   Total Treatment Time: 60     Medicare/BCBS Time Tracking (below)   Total Timed Codes (min):  50 1:1 Treatment Time:  40     TREATMENT AREA =  Low back pain [M54.5]    SUBJECTIVE    Pain Level (on 0 to 10 scale):  3  / 10   Medication Changes/New allergies or changes in medical history, any new surgeries or procedures? NO    If yes, update Summary List   Subjective Functional Status/Changes:  []  No changes reported     Pt reporting pain has not changed but her ability to move with pain stable level. Less increase in pain with bridging and bed mobility.        OBJECTIVE  Modalities Rationale:     decrease inflammation and decrease pain to improve patient's ability to perform ADLs, IADLs with less pain        min [] Estim, type/location:                                      []  att     []  unatt     []  w/US     []  w/ice    []  w/heat    min []  Mechanical Traction: type/lbs                   []  pro   []  sup   []  int   []  cont    []  before manual    []  after manual    min []  Ultrasound, settings/location:      min []  Iontophoresis w/ dexamethasone, location:                                               []  take home patch       []  in clinic   10 min [x]  Ice     []  Heat    location/position: Supine to lumbar     min []  Vasopneumatic Device, press/temp:     min []  Other:    [x] Skin assessment post-treatment (if applicable):    [x]  intact    []  redness- no adverse reaction     []redness - adverse reaction:      35/25 min Therapeutic Exercise:  [x]  See flow sheet   Rationale:      increase ROM and increase strength to improve the patients ability to perform ADLs, IADLs with less pain         15 min Manual Therapy: Technique: [x] S/DTM []IASTM []PROM [] Passive Stretching   [x]manual TPR    [x]Jt manipulation:Gr I [x] II [x]  III [] IV[] V[]  Treatment Area:  Lumbar PA in ~L1-L5, STM to L QL, paraspinals   Rationale:      decrease pain and increase ROM to improve patient's ability to perform ADLs, IADLs with less pain         Billed With/As:   [x] TE   [] TA   [] Neuro   [] Self Care Patient Education: [x] Review HEP    [] Progressed/Changed HEP based on:   [] positioning   [] body mechanics   [] transfers   [] heat/ice application    [] other:        Other Objective/Functional Measures:  Reviewed HEP to emphasize strengthening and stability for core and hip. Post Treatment Pain Level (on 0 to 10) scale:   2  / 10     ASSESSMENT    Assessment/Changes in Function:     Modified program to reduce pain. Advanced to [place] [facility] for gluteal strengthening and emphasized pt gluteal stability to reduce forces at lumbar region this am.     []  See Progress Note/Recertification   Patient will continue to benefit from skilled PT services to modify and progress therapeutic interventions, address functional mobility deficits, address ROM deficits, address strength deficits, analyze and address soft tissue restrictions, analyze and cue movement patterns and analyze and modify body mechanics/ergonomics to attain remaining goals. Progress toward goals / Updated goals:    Pt with fair progress to STG of reduced pain. 2. Pt will report reduction in max pain <3/10 to assist with return to full work [place]. ,y--fair progress with 3-4/10     PLAN    []  Upgrade activities as tolerated YES Continue plan of care   []  Discharge due to :    []  Other:      Therapist: Daisy Emerson PT    Date: 3/2/2020 Time: 12:11 PM     Future Appointments   Date Time Provider Deanna Yanez   3/6/2020 11:00 AM Enid Oneal PT REHAB CENTER AT [facility]   3/9/2020 10:00 AM Enid Oneal PT REHAB CENTER AT [facility]   3/13/2020  9:30 AM Enid Oneal PT REHAB CENTER AT [facility]   3/18/2020 11:30 AM Abdoulaye Miguel Ángel Coleman, PT REHAB CENTER AT [facility]   3/23/2020 10:00 AM Latosha Bentley, PT REHAB CENTER AT [facility]

## 2020-03-06 ENCOUNTER — HOSPITAL ENCOUNTER (OUTPATIENT)
Dept: PHYSICAL THERAPY | Age: 80
Discharge: HOME | End: 2020-03-06
Payer: MEDICARE

## 2020-03-06 PROCEDURE — 97110 THERAPEUTIC EXERCISES: CPT

## 2020-03-06 PROCEDURE — 97140 MANUAL THERAPY 1/> REGIONS: CPT

## 2020-03-06 NOTE — PROGRESS NOTES
PHYSICAL THERAPY - DAILY TREATMENT NOTE     Patient Name: Tomi Peraza        Date: 3/6/2020  : 1940   YES Patient  Verified  Visit #:     Insurance: Payor: Marlena Borne / Plan: [place] MEDICARE PART A & B / Product Type: Medicare /      In time: 11 Out time: 12   Total Treatment Time: 60     Medicare/BCBS Time Tracking (below)   Total Timed Codes (min):  50 1:1 Treatment Time:  40     TREATMENT AREA =  Low back pain [M54.5]    SUBJECTIVE    Pain Level (on 0 to 10 scale):  3  / 10   Medication Changes/New allergies or changes in medical history, any new surgeries or procedures? NO    If yes, update Summary List   Subjective Functional Status/Changes:  []  No changes reported     Pt reporting more pain across low back today after exercising at gym. Most notable pain in am when she is trying to get up out of bed.        OBJECTIVE  Modalities Rationale:     decrease inflammation and decrease pain to improve patient's ability to perform ADLs, IADLs with less pain     min [] Estim, type/location:                                      []  att     []  unatt     []  w/US     []  w/ice    []  w/heat    min []  Mechanical Traction: type/lbs                   []  pro   []  sup   []  int   []  cont    []  before manual    []  after manual    min []  Ultrasound, settings/location:      min []  Iontophoresis w/ dexamethasone, location:                                               []  take home patch       []  in clinic   10 min [x]  Ice     []  Heat    location/position: Supine to lumbar     min []  Vasopneumatic Device, press/temp:     min []  Other:    [x] Skin assessment post-treatment (if applicable):    [x]  intact    []  redness- no adverse reaction     []redness - adverse reaction:      35/25 min Therapeutic Exercise:  [x]  See flow sheet   Rationale:      increase ROM and increase strength   to improve the patients ability to perform ADLs, IADLs with less pain     15 min Manual Therapy: Technique: [x] S/DTM []IASTM []PROM [] Passive Stretching   [x]manual TPR    [x]Jt manipulation:Gr I [x] II [x]  III [] IV[] V[]  Treatment Area:  Lumbar PA Gr 1-4 lumbar, lower thoracic, STM B paraspinals   Rationale:      decrease pain and increase ROM   to improve patient's ability to perform stairs, sit<>stand with less pain         Billed With/As:   [x] TE   [] TA   [] Neuro   [] Self Care Patient Education: [x] Review HEP    [] Progressed/Changed HEP based on:   [x] positioning   [x] body mechanics   [] transfers   [x] heat/ice application    [] other:        Other Objective/Functional Measures: Added sit<>stand with TA draw, hip hinging, and TRX squats, rows. Added SLS EO and EC for strength and stability at ankle, knee. Post Treatment Pain Level (on 0 to 10) scale:   2 / 10     ASSESSMENT    Assessment/Changes in Function:     SR with 10 sec EO, < 5 sec EC. Pt with good response to progression of strengthening. Pt ed on gym program with gradual progression of activity to reduce irritation. []  See Progress Note/Recertification   Patient will continue to benefit from skilled PT services to modify and progress therapeutic interventions, address functional mobility deficits, address ROM deficits, address strength deficits, analyze and address soft tissue restrictions, analyze and cue movement patterns and analyze and modify body mechanics/ergonomics to attain remaining goals. Progress toward goals / Updated goals:    Pt with fair progress to STG of reduced pain.   2. Pt will report reduction in max pain <3/10 to assist with return to full work activity--3-4/10, fair progress but painful still at 5/10 in am.     PLAN    [x]  Upgrade activities as tolerated YES Continue plan of care   []  Discharge due to :    []  Other:      Therapist: Enedina Sears PT    Date: 3/6/2020 Time: 12 PM     Future Appointments   Date Time Provider Deanna Yanez   3/9/2020 10:00 AM Cris Hebert, PT REHAB CENTER AT [facility]   3/13/2020 9:30 AM Mary Ellen Key PT REHAB CENTER AT [facility]   3/18/2020 11:30 AM Mary Ellen Key PT REHAB CENTER AT [facility]   3/23/2020 10:00 AM Mary Ellen Key PT REHAB CENTER AT [facility]

## 2020-03-09 ENCOUNTER — HOSPITAL ENCOUNTER (OUTPATIENT)
Dept: PHYSICAL THERAPY | Age: 80
Discharge: HOME | End: 2020-03-09
Payer: MEDICARE

## 2020-03-09 PROCEDURE — 97110 THERAPEUTIC EXERCISES: CPT

## 2020-03-09 PROCEDURE — 97140 MANUAL THERAPY 1/> REGIONS: CPT

## 2020-03-09 NOTE — PROGRESS NOTES
PHYSICAL THERAPY - DAILY TREATMENT NOTE     Patient Name: William Hughes        Date: 3/9/2020  : 1940   YES Patient  Verified  Visit # :  Insurance: Payor: Daniel Kennedy / Plan: [place] MEDICARE PART A & B / Product Type: Medicare /      In time: 10 Out time: 1050    Total Treatment Time: 50     Medicare/BCBS Time Tracking (below)   Total Timed Codes (min):  40 1:1 Treatment Time:  40     TREATMENT AREA =  Low back pain [M54.5]    SUBJECTIVE    Pain Level (on 0 to 10 scale):  3  / 10   Medication Changes/New allergies or changes in medical history, any new surgeries or procedures? NO    If yes, update Summary List   Subjective Functional Status/Changes:  []  No changes reported     Was able to perform gym activity as directed. Reduced symptoms noted. Hamstrings stretching was not able to perform secondary to time.        OBJECTIVE  Modalities Rationale:     decrease inflammation and decrease pain to improve patient's ability to perform ADLs, IADLs with less pain     min [] Estim, type/location:                                      []  att     []  unatt     []  w/US     []  w/ice    []  w/heat    min []  Mechanical Traction: type/lbs                   []  pro   []  sup   []  int   []  cont    []  before manual    []  after manual    min []  Ultrasound, settings/location:      min []  Iontophoresis w/ dexamethasone, location:                                               []  take home patch       []  in clinic   10 min []  Ice     [x]  Heat    location/position: Supine to lumbar     min []  Vasopneumatic Device, press/temp:     min []  Other:    [x] Skin assessment post-treatment (if applicable):    [x]  intact    []  redness- no adverse reaction     []redness - adverse reaction:      25 min Therapeutic Exercise:  [x]  See flow sheet   Rationale:      increase ROM and increase strength   to improve the patients ability to perform stairs, sit<>stand with less pain     15 min Manual Therapy: Technique:      [x] S/DTM []IASTM []PROM [] Passive Stretching   [x]manual TPR    [x]Jt manipulation:Gr I [x] II [x]  III [] IV[] V[]  Treatment Area:   TPR to B QL, gluteals   Rationale:      decrease pain and increase ROM   to improve patient's ability to perform stairs, squats and sit<>stand with less pain       Billed With/As:   [x] TE   [] TA   [] Neuro   [] Self Care Patient Education: [x] Review HEP    [] Progressed/Changed HEP based on:   [x] positioning   [x] body mechanics   [] transfers   [x] heat/ice application    [] other:        Other Objective/Functional Measures:  Re-ed on sit<>stand and squatting to improve LE mechanics, reduce lumbar strain. Re-ed on gym program holding from TM to upright bike. Post Treatment Pain Level (on 0 to 10) scale:   2  / 10     ASSESSMENT    Assessment/Changes in Function:     Pt with good response to updated there ex. Pt with improved squatting mechanics noted. []  See Progress Note/Recertification   Patient will continue to benefit from skilled PT services to modify and progress therapeutic interventions, address functional mobility deficits, address ROM deficits, address strength deficits, analyze and address soft tissue restrictions and analyze and modify body mechanics/ergonomics to attain remaining goals. Progress toward goals / Updated goals:    Pt with fair progress to STG 2 of reduced pain.   2. Pt will report reduction in max pain <3/10 to assist with return to full work activity--3-7, fair progress with exception of activity in pm.     PLAN    [x]  Upgrade activities as tolerated YES Continue plan of care   []  Discharge due to :    []  Other:      Therapist: Lisa Benson PT    Date: 3/9/2020 Time: 11 am     Future Appointments   Date Time Provider Deanna Yanez   3/9/2020 10:00 AM Deejay Bull, PT REHAB CENTER AT [facility]   3/13/2020  9:30 AM Deejay Bull, PT REHAB CENTER AT [facility]   3/18/2020 11:30 AM Deejay Bull PT REHAB CENTER AT [facility]   3/23/2020 10:00 AM Deejay Bull, PT REHAB CENTER AT [address]

## 2020-03-13 ENCOUNTER — HOSPITAL ENCOUNTER (OUTPATIENT)
Dept: PHYSICAL THERAPY | Age: 80
Discharge: HOME | End: 2020-03-13
Payer: MEDICARE

## 2020-03-13 PROCEDURE — 97140 MANUAL THERAPY 1/> REGIONS: CPT

## 2020-03-13 PROCEDURE — 97110 THERAPEUTIC EXERCISES: CPT

## 2020-03-13 NOTE — PROGRESS NOTES
PHYSICAL THERAPY - DAILY TREATMENT NOTE     Patient Name: Estuardo Wolf        Date: 3/13/2020  : 1940   YES Patient  Verified  Visit # :      Insurance: Payor: Shashank Gray / Plan: [place] MEDICARE PART A & B / Product Type: Medicare /      In time: 9 Out time: 950    Total Treatment Time: 50     Medicare/BCBS Time Tracking (below)   Total Timed Codes (min):  40 1:1 Treatment Time:  25     TREATMENT AREA =  Low back pain [M54.5]    SUBJECTIVE    Pain Level (on 0 to 10 scale):  2-3  / 10   Medication Changes/New allergies or changes in medical history, any new surgeries or procedures? NO    If yes, update Summary List   Subjective Functional Status/Changes:  []  No changes reported     Pt with increased reduction in pain this am.  Feels like the reduction in bridges has helped pain.   However, she is still feeling more symptoms in am.       OBJECTIVE  Modalities Rationale:     decrease inflammation and decrease pain   to improve patient's ability to perform ADLs, IADLs with less pain     min [] Estim, type/location:                                      []  att     []  unatt     []  w/US     []  w/ice    []  w/heat    min []  Mechanical Traction: type/lbs                   []  pro   []  sup   []  int   []  cont    []  before manual    []  after manual    min []  Ultrasound, settings/location:      min []  Iontophoresis w/ dexamethasone, location:                                               []  take home patch       []  in clinic   10 min []  Ice     [x]  Heat    location/position: Supine to lumbar spine post manual    min []  Vasopneumatic Device, press/temp:     min []  Other:    [x] Skin assessment post-treatment (if applicable):    [x]  intact    []  redness- no adverse reaction     []redness - adverse reaction:      25/10 min Therapeutic Exercise:  [x]  See flow sheet   Rationale:      increase ROM, increase strength and improve LE mechanics   to improve the patients ability to perform stairs, sit<>stand with less pain     15 min Manual Therapy: Technique:      [x] S/DTM []IASTM []PROM [] Passive Stretching   [x]manual TPR    [x]Jt manipulation:Gr I [x] II [x]  III [x] IV[] V[]  Treatment Area:   TPR to B QL, gluteals, piriformis L>R   Rationale:      decrease pain and increase ROM   to improve patient's ability to perform stairs, transfers, squats with less pain       Billed With/As:   [x] TE   [] TA   [] Neuro   [] Self Care Patient Education: [x] Review HEP    [] Progressed/Changed HEP based on:   [x] positioning   [x] body mechanics   [] transfers   [x] heat/ice application    [] other:        Other Objective/Functional Measures:  Advanced stretching program with IR of B Hip in  Addition to [facility], ER stretching. Post Treatment Pain Level (on 0 to 10) scale:  1-2  / 10     ASSESSMENT    Assessment/Changes in Function:     Pt with good response to modified program, reduction in pain noted in lumbar spine with modifications. []  See Progress Note/Recertification   Patient will continue to benefit from skilled PT services to modify and progress therapeutic interventions, address functional mobility deficits, address ROM deficits, address strength deficits, analyze and address soft tissue restrictions, analyze and modify body mechanics/ergonomics and instruct in home and community integration to attain remaining goals. Progress toward goals / Updated goals:  Pt with good progress noted today, first day o freduced am pain. 1. Pt will report reduction in max pain <3/10 to assist with return to full work activity--3-4, good progress noted, reduced overall pain.      PLAN    [x]  Upgrade activities as tolerated YES Continue plan of care   []  Discharge due to :    []  Other:      Therapist: Blair Hernandez, PT    Date: 3/13/2020 Time: 10 am     Future Appointments   Date Time Provider Deanna Yanez   3/18/2020 11:30 AM Gaby Blanco PT [facility]   3/23/2020 10:00 AM Gaby Blanco, PT [facility]

## 2020-03-18 ENCOUNTER — APPOINTMENT (OUTPATIENT)
Dept: PHYSICAL THERAPY | Age: 80
End: 2020-03-18
Payer: MEDICARE

## 2020-03-23 ENCOUNTER — APPOINTMENT (OUTPATIENT)
Dept: PHYSICAL THERAPY | Age: 80
End: 2020-03-23
Payer: MEDICARE

## 2020-07-06 ENCOUNTER — APPOINTMENT (OUTPATIENT)
Dept: PHYSICAL THERAPY | Age: 80
End: 2020-07-06

## 2020-07-14 ENCOUNTER — HOSPITAL ENCOUNTER (OUTPATIENT)
Dept: PHYSICAL THERAPY | Age: 80
Discharge: HOME | End: 2020-07-14
Payer: MEDICARE

## 2020-07-14 PROCEDURE — 97014 ELECTRIC STIMULATION THERAPY: CPT

## 2020-07-14 PROCEDURE — 97110 THERAPEUTIC EXERCISES: CPT

## 2020-07-14 PROCEDURE — 97140 MANUAL THERAPY 1/> REGIONS: CPT

## 2020-07-14 NOTE — PROGRESS NOTES
PHYSICAL THERAPY - DAILY TREATMENT NOTE     Patient Name: Vinicius Martinez        Date: 2020  : 1940   YES Patient  Verified  Visit #:     Insurance: Payor: Natacha Oyster / Plan: [place] MEDICARE PART A & B / Product Type: Medicare /      In time: 9 Out time: 945   Total Treatment Time: 45     Medicare/BCBS Time Tracking (below)   Total Timed Codes (min):  45 1:1 Treatment Time:  45     TREATMENT AREA =  Low back pain [M54.5]    SUBJECTIVE    Pain Level (on 0 to 10 scale):   10   Medication Changes/New allergies or changes in medical history, any new surgeries or procedures? NO    If yes, update Summary List   Subjective Functional Status/Changes:  []  No changes reported       See Re-cert. OBJECTIVE      30 min Therapeutic Exercise:  [x]  See flow sheet   Rationale:      increase ROM and increase strength to improve the patients ability to perform ADLs, IADLs with less pain            15 min Manual Therapy: Technique:      [x] S/DTM [x]IASTM [x]PROM [] Passive Stretching   [x]manual TPR    [x]Jt manipulation:Gr I [x] II [x]  III [] IV[] V[]  Treatment Area:  B STM lumbar paraspinals, LAD   Rationale:      decrease pain and increase ROM to improve patient's ability to perform ADLs, IADLs with less pain        Billed With/As:   [x] TE   [] TA   [] Neuro   [] Self Care Patient Education: [x] Review HEP    [] Progressed/Changed HEP based on:   [] positioning   [] body mechanics   [] transfers   [] heat/ice application    [] other:        Other Objective/Functional Measures:    See Re-cert. Post Treatment Pain Level (on 0 to 10) scale:   2   10     ASSESSMENT    Assessment/Changes in Function:     See Re-cert.        [x]  See Progress Note/Recertification   Patient will continue to benefit from skilled PT services to modify and progress therapeutic interventions, address functional mobility deficits, address ROM deficits, address strength deficits, analyze and address soft tissue restrictions and analyze and cue movement patterns to attain remaining goals. Progress toward goals / Updated goals:  See Re-cert.        PLAN    [x]  Upgrade activities as tolerated YES Continue plan of care   []  Discharge due to :    []  Other:      Therapist: Renee Greer PT    Date: 7/14/2020 Time: 8:25 AM     Future Appointments   Date Time Provider Deanna Yanez   7/14/2020  9:00 AM Arslan Chen

## 2020-07-14 NOTE — PROGRESS NOTES
[facility] PHYSICAL THERAPY AT [address] [facility] [place], [address] [facility], [address] [place] [address], [address] [place] [address]  Phone: [phone]  Fax: [phone]  Request for use of Dry Needling/Intramuscular Manual Therapy  Patient Name: Maynor Meneses : 1940   Treatment/Medical Diagnosis: Low back pain [M54.5]   Referral Source: Helen Cadena MD     Date of Initial Visit: 20 Attended Visits: 7 Missed Visits: 0     Based on findings from the physical therapy examination and evaluation, the evaluating therapist believes the patient, Maynor Meneses would benefit from including Dry Needling as part of the plan of care. Dry needling is an effective treatment technique utilized in conjunction with other physical therapy interventions to inactivate myofascial trigger points and the pain and dysfunction they cause. It involves the use of a very fine (usually 0.3 mm/30 gauge) solid filament sterile needle (also used for acupuncture) which is inserted into the skin and directly into a myofascial trigger point. Repeated strokes or movements of the needle without completely withdrawing it help to inactive the trigger point, all of which may take approximately 30 - 60 seconds at each site. Benefits include inactivation of trigger points, decreased pain, increased muscle length, improved movement patterns, and restoration of function. Potential risks include the following: post-needling soreness, infection, bruising/bleeding, penetration of a nerve, and pneumothorax. All treating therapist have been thoroughly educated in ways to avoid the adverse reactions. Dry Needling is an advanced procedure that requires additional training including greater than 54 hours of intensive course work.  Most treatment programs will consist of one session of dry needling each week and a possible second treatment to consist of muscle re-education, flexibility, strengthening and other manual techniques to facilitate the benefits from the dry needling therapy. If you agree with this recommendation, please sign the attached prescription form and fax it to us at [phone]. If you have questions or concerns regarding dry needling or any other treatment we may be providing, please contact us at [phone]. Thank you for allowing us to assist in the care of your patient. Therapist Signature: Vandana Dunn, MEME Date: 7/14/2020     Time: 9:28 AM   NOTE TO PHYSICIAN:  PLEASE COMPLETE THE ORDERS BELOW AND FAX TO   [facility]: [phone]  If you are unable to process this request in 24 hours please contact our office: [phone]    ? I have read the above request and AGREE to the recommendation of including dry needling as part of the plan of care. ? I have read the above request and DO NOT AGREE to including dry needling as part of the plan of care.    ? I have read the above report and request that my patient continue therapy with the following changes/special instructions: _________________________________________________     Physician Signature:        Date:       Time:

## 2020-07-14 NOTE — PROGRESS NOTES
[facility] [facility] PHYSICAL THERAPY AT [address] [facility], [address] [place] [address], [address], [address] [place] [address] - Phone: [phone]  Fax: [phone] THERAPY          Patient Name: Britany Barfield : 1940   Treatment/Medical Diagnosis: Low back pain [M54.5]   Onset Date:     Referral Source: Rosenda Whalen MD [facility]): 40   Prior Hospitalization: See Medical History Provider #: [id number]   Prior Level of Function: Symptom free, working out regularly at the gym   Comorbidities: Thyroid problems, Breast Cancer in 2013, Scoliosis   Medications: Verified on Patient Summary List   Visits from [facility]: 7 Missed Visits: 0     Previous Goals:  1. Pt will be independent with HEP for strengthening, stretching program to improve ability to perform ADLs. 2. Pt will report reduction in max pain <3/10 to assist with return to full work activity. 3.  Pt will demo understanding of correct body mechanics and postural re-ed to improve symptoms. Prior Level/Current Level:  1) Prior Level: n/a   Current Level: independent and compliant   Goal Met? yes  2) Prior Level: 8/10   Current Level: 5/10   Goal Met? no  3) Prior Level: n/a   Current Level: pt with good understanding of posture for ADLs   Goal Met? yes    Key Functional Changes/Progress: Pt with limited access to PT, has been experiencing increased soreness, tightness and pain in L lumbar region. She is returning to PT after last visit on 3/13/20. She reports increased LBP with L sided pain during standing, walking with reduction in symptoms with seated activity. Her ROM is limited for trunk flexion and painful with SB B. Her gluteal strength is 4+/5 R vs 4/5 L with L hip abduction at 3+/5. She demonstrated TTP t/o L lumbar paraspinals, piriformis and QL. She demonstrates scoliotic curvature with reduced intervertebral mobility in~L1-L5.   Problem List: pain affecting function, decrease ROM, decrease strength, decrease ADL/ functional abilitiies, decrease activity tolerance, decrease flexibility/ joint mobility and decrease transfer abilities   Treatment Plan may include any combination of the following: Therapeutic exercise, Therapeutic activities, Neuromuscular re-education, Physical agent/modality, Gait/balance training, Manual therapy, Patient education, Self Care training, Functional mobility training and Stair training  Patient Goal(s) has been updated and includes: To have less pain, ability to perform amb and work with less limitations    Goals for this certification period include and are to be achieved in   4  weeks:  1. Pt will demo understanding of and compliance with DC HEP to improve symptoms. 2.  Pt will demonstrate ability to perform amb in community 30 min with 3/10 LBP. 3.  Pt will report +4 on GROC scale to indicate improved function and be prepared to DC to HEP. Frequency / Duration:   Patient to be seen   1-2   times per week for   4    weeks:    Assessments/Recommendations: Pt with good progress toward LTGs, has been limited by access due to COVID restrictions. She would benefit from returning to PT to complete POC to improve function, reduce pain. If you have any questions/comments please contact us directly at [phone]. Thank you for allowing us to assist in the care of your patient. Therapist Signature: Carlos Goodman, PT Date: 1/03/6567   Certification Period:    7/14/20-9/13/20   Time: 8:27 AM   NOTE TO PHYSICIAN:  PLEASE COMPLETE THE ORDERS BELOW AND FAX TO   [facility] at [address]: [phone].   If you are unable to process this request in 24 hours please contact our office: [phone].    ___ I have read the above report and request that my patient continue as recommended.   ___ I have read the above report and request that my patient continue therapy with the following changes/special instructions: ________________________________________________   ___ I have read the above report and request that my patient be discharged from therapy.      Physician Signature:        Date:       Time:

## 2020-07-15 ENCOUNTER — APPOINTMENT (OUTPATIENT)
Dept: PHYSICAL THERAPY | Age: 80
End: 2020-07-15

## 2020-07-22 ENCOUNTER — HOSPITAL ENCOUNTER (OUTPATIENT)
Dept: PHYSICAL THERAPY | Age: 80
Discharge: HOME | End: 2020-07-22
Payer: MEDICARE

## 2020-07-22 PROCEDURE — 97110 THERAPEUTIC EXERCISES: CPT

## 2020-07-22 PROCEDURE — 97140 MANUAL THERAPY 1/> REGIONS: CPT

## 2020-07-22 PROCEDURE — 97014 ELECTRIC STIMULATION THERAPY: CPT

## 2020-07-22 NOTE — PROGRESS NOTES
PHYSICAL THERAPY - DAILY TREATMENT NOTE     Patient Name: Pily Juan        Date: 2020  : 1940   YES Patient  Verified  Visit #:     Insurance: Payor: Idolina Babinski / Plan: [place] MEDICARE PART A & B / Product Type: Medicare /      In time: 863 Out time: 5   Total Treatment Time: 45     Medicare/BCBS Time Tracking (below)   Total Timed Codes (min):  30 1:1 Treatment Time:  30     TREATMENT AREA =  Low back pain [M54.5]    SUBJECTIVE    Pain Level (on 0 to 10 scale): 4 / 10   Medication Changes/New allergies or changes in medical history, any new surgeries or procedures? NO    If yes, update Summary List   Subjective Functional Status/Changes:  []  No changes reported       Functional improvements: Pt reports pain continues to limit activity but slight improvement noted with resuming PT.    Functional impairments: Pain with ADLs, IADLs, amb         OBJECTIVE  Modalities Rationale:     decrease pain to improve patient's ability to perform work with less pain     15 min [x] Estim, type/location: Lumbar in supine                                    []  att     [x]  unatt     []  w/US     []  w/ice    []  w/heat    min []  Mechanical Traction: type/lbs                   []  pro   []  sup   []  int   []  cont    []  before manual    []  after manual    min []  Ultrasound, settings/location:      min []  Iontophoresis w/ dexamethasone, location:                                               []  take home patch       []  in clinic    min []  Ice     []  Heat    location/position:     min []  Vasopneumatic Device, press/temp:     min []  Other:    [x] Skin assessment post-treatment (if applicable):    [x]  intact    []  redness- no adverse reaction     []redness  adverse reaction:      15 min Therapeutic Exercise:  [x]  See flow sheet   Rationale:      increase ROM and increase strength to improve the patients ability to perform work with less pain       15 min Manual Therapy: Technique:      [x] S/DTM []IASTM []PROM [] Passive Stretching   [x]manual TPR    [x]Jt manipulation:Gr I [x] II [x]  III [] IV[] V[]  Treatment Area:  L QL and paraspinal release lumbar and lower thoracic, lumbar PA ~L3-L5   Rationale:      decrease pain, increase ROM and increase tissue extensibility to improve patient's ability to work with less pain      Billed With/As:   [x] TE   [] TA   [] Neuro   [] Self Care Patient Education: [x] Review HEP    [] Progressed/Changed HEP based on:   [] positioning   [] body mechanics   [] transfers   [] heat/ice application    [] other:        Other Objective/Functional Measures:    Pt HEP progressed to improve L sided foraminal opening. Pt with addition of clams, reverse clams and increased hip stretching activity to promote pain reduction. Post Treatment Pain Level (on 0 to 10) scale:   2  / 10     ASSESSMENT    Assessment/Changes in Function:     Pt with good response to updated there ex. []  See Progress Note/Recertification   Patient will continue to benefit from skilled PT services to modify and progress therapeutic interventions, address functional mobility deficits, address ROM deficits, address strength deficits, analyze and address soft tissue restrictions, analyze and modify body mechanics/ergonomics, assess and modify postural abnormalities and instruct in home and community integration to attain remaining goals. Progress toward goals / Updated goals:    1.  Pt will demo understanding of and compliance with DC HEP to improve symptoms--good progress, will reassess response to updated exercises at next visit.   2.  Pt will demonstrate ability to perform amb in community 30 min with 3/10 LBP--5/10 currently, fair progress.        PLAN    [x]  Upgrade activities as tolerated YES Continue plan of care   []  Discharge due to :    []  Other:      Therapist: Bri Inman, PT    Date: 7/22/2020 Time: 9:09 AM     Future Appointments   Date Time Provider Deanna Yanez 7/22/2020  4:15 PM Lizandro Hernandez, PT [facility]   7/28/2020 10:30 AM Lizandro Hernandez, PT [facility]   8/3/2020  9:30 AM Arch Brit, PT [facility]   8/5/2020 10:45 AM Lizandro Hernandez, PT [facility]   8/11/2020 10:30 AM Freddy Perea, PT [facility]   8/14/2020  9:45 AM Lizandro Hernandez, PT [facility]   8/17/2020 10:45 AM Lizandro Hernandez, PT [facility]   8/19/2020 10:45 AM Lizandro Hernandez, PT [facility]   8/24/2020  9:30 AM Arch Brit, PT [facility]   8/27/2020  9:45 AM Arch Brit, PT [facility]

## 2020-07-28 ENCOUNTER — APPOINTMENT (OUTPATIENT)
Dept: PHYSICAL THERAPY | Age: 80
End: 2020-07-28
Payer: MEDICARE

## 2020-07-30 ENCOUNTER — HOSPITAL ENCOUNTER (OUTPATIENT)
Dept: PHYSICAL THERAPY | Age: 80
Discharge: HOME | End: 2020-07-30
Payer: MEDICARE

## 2020-07-30 PROCEDURE — 97140 MANUAL THERAPY 1/> REGIONS: CPT

## 2020-07-30 PROCEDURE — 97014 ELECTRIC STIMULATION THERAPY: CPT

## 2020-07-30 PROCEDURE — 97110 THERAPEUTIC EXERCISES: CPT

## 2020-07-30 NOTE — PROGRESS NOTES
PHYSICAL THERAPY - DAILY TREATMENT NOTE     Patient Name: Eber Castaneda        Date: 2020  : 1940   YES Patient  Verified  Visit #:     Insurance: Payor: Destinee Odom / Plan: [place] MEDICARE PART A & B / Product Type: Medicare /      In time: 1140 Out time: 1240   Total Treatment Time: 60     Medicare/BCBS Time Tracking (below)   Total Timed Codes (min):  45 1:1 Treatment Time: 45     TREATMENT AREA =  Low back pain [M54.5]    SUBJECTIVE    Pain Level (on 0 to 10 scale): 3 / 10   Medication Changes/New allergies or changes in medical history, any new surgeries or procedures? NO    If yes, update Summary List   Subjective Functional Status/Changes:  []  No changes reported       Functional improvements: Saw Dr. Gregory Coleman and received order for dry needling. Pt with reduced pain since last visit.    Functional impairments: \"Soreness\" vs pain with ADLs, IADLs, amb         OBJECTIVE  Modalities Rationale:     decrease pain to improve patient's ability to perform work, ADLs with less pain     15 min [x] Estim, type/location: Lumbar in supine                                    []  att     [x]  unatt     []  w/US     []  w/ice    []  w/heat    min []  Mechanical Traction: type/lbs                   []  pro   []  sup   []  int   []  cont    []  before manual    []  after manual    min []  Ultrasound, settings/location:      min []  Iontophoresis w/ dexamethasone, location:                                               []  take home patch       []  in clinic    min []  Ice     []  Heat    location/position:     min []  Vasopneumatic Device, press/temp:     min []  Other:    [x] Skin assessment post-treatment (if applicable):    [x]  intact    []  redness- no adverse reaction     []redness  adverse reaction:      30 min Therapeutic Exercise:  [x]  See flow sheet   Rationale:      increase ROM and increase strength to improve the patients ability to perform work and ADLs with less pain       15 min Manual Therapy: Technique:      [x] S/DTM []IASTM []PROM [] Passive Stretching   [x]manual TPR    [x]Jt manipulation:Gr I [x] II [x]  III [] IV[] V[]  Treatment Area: STM and TPR to L QL, piriformis, gluteus medius   Rationale:      decrease pain, increase ROM and increase tissue extensibility to improve patient's ability to work with less pain      Billed With/As:   [x] TE   [] TA   [] Neuro   [] Self Care Patient Education: [x] Review HEP    [] Progressed/Changed HEP based on:   [] positioning   [] body mechanics   [] transfers   [] heat/ice application    [] other:        Other Objective/Functional Measures:  Reviewed HEP and continued progression of stretching, strengthening. Post Treatment Pain Level (on 0 to 10) scale:   1 / 10     ASSESSMENT    Assessment/Changes in Function:     Pt with good response to level 1 strengthening and stretching. TTP at L QL, gluteus medius and piriformis. []  See Progress Note/Recertification   Patient will continue to benefit from skilled PT services to modify and progress therapeutic interventions, address functional mobility deficits, address ROM deficits, address strength deficits, analyze and address soft tissue restrictions, analyze and modify body mechanics/ergonomics and assess and modify postural abnormalities to attain remaining goals. Progress toward goals / Updated goals:    1.  Pt will demo understanding of and compliance with DC HEP to improve symptoms--good progress with pt performing 50% of prior activity with good response.   2.  Pt will demonstrate ability to perform amb in community 30 min with 3/10 LBP--3-5/10 currently, fair progress.        PLAN    [x]  Upgrade activities as tolerated YES Continue plan of care   []  Discharge due to :    []  Other:      Therapist: Magan Salmeron PT    Date: 7/30/2020 Time: 11 AM     Future Appointments   Date Time Provider Deanna Yanez   7/30/2020 11:30 AM Rochell Deal [facility]   8/3/2020  9:30 AM Denver Sarah, Davi Malik [facility]   8/5/2020 10:45 AM Juli Diane, PT [facility]   8/11/2020 10:30 AM Dwayne Hernández, PT [facility]   8/14/2020  9:45 AM Juli Diane, PT [facility]   8/17/2020 10:45 AM Juli Diane, PT [facility]   8/19/2020 10:45 AM Juli Diane, PT [facility]   8/24/2020  9:30 AM Dwayne Hernández, PT [facility]   8/27/2020  9:45 AM Dwayne Hernández, PT [facility]

## 2020-08-03 ENCOUNTER — HOSPITAL ENCOUNTER (OUTPATIENT)
Dept: PHYSICAL THERAPY | Age: 80
Discharge: HOME | End: 2020-08-03
Payer: MEDICARE

## 2020-08-03 PROCEDURE — 20561 NDL INSJ W/O NJX 3+ MUSC: CPT

## 2020-08-03 PROCEDURE — 97032 APPL MODALITY 1+ESTIM EA 15: CPT

## 2020-08-03 PROCEDURE — 97140 MANUAL THERAPY 1/> REGIONS: CPT

## 2020-08-03 NOTE — PROGRESS NOTES
PHYSICAL THERAPY - DAILY TREATMENT NOTE - DRY NEEDLE NOTE    Patient Name: Kadie Mohamud        Date: 8/3/2020  : 1940   YES  Patient  Verified  Visit #:     Insurance: Payor: Florinda Zurita / Plan: [place] MED[place] PART A & B / Product Type: Medicare /      In time: 930 Out time: 1020   Total Treatment Time: 45     Medicare Time Tracking (below)   Total Timed Codes (min):  35 1:1 Treatment Time:  40     TREATMENT AREA = Low back pain [M54.5]    SUBJECTIVE  Pain Level (on 0 to 10 scale):  3  / 10   Medication Changes/New allergies or changes in medical history, any new surgeries or procedures?     NO    If yes, update Summary List   Subjective Functional Status/Changes:  []  No changes reported     Tightness in LB, catch in hip area          OBJECTIVE  Modalities Rationale:     decrease pain, increase tissue extensibility, decrease trigger point density and increase stability/ ROM within muscle to improve patient's ability to perform ADLs/ return to PLOF  10 min [x] Estim, type/location: Direct using Pointer Excel II ( between 1-16 HZ) to DN                                     [x]  att     []  unatt     [x]  W/dry needling     []  w/ice    []  w/heat    min []  Mechanical Traction: type/lbs                   []  pro   []  sup   []  int   []  cont    []  before manual    []  after manual    min []  Ultrasound, settings/location:      min []  Iontophoresis w/ dexamethasone, location:                                               []  take home patch       []  in clinic   10 min []  Ice     []  Heat    location/position:     min []  Vasopneumatic Device, press/temp:     min []  Other:    [x] Skin assessment post-treatment (if applicable):    [x]  intact    []  redness- no adverse reaction     []redness  adverse reaction:        15 min Manual Therapy: Palpation, assessment of TP  -(needle insertion time not included)  STM to L glut/ LB area    Rationale:     decrease pain, increase tissue extensibility, decrease trigger point density and increase stability within muscle to improve patient's ability to perform ADLs/ return to PLOF    Select one untimed code below based on number of muscle groups needled:  []  CPT 58789:  needle insertion(s) without injection(s); 1 or 2 muscle(s)  [x]  CPT 04369:  needle insertion(s) without injection(s); 3 or more muscles. 5 Min--dry needle insertion   Rationale:      decrease pain, increase tissue extensibility, and decrease trigger points to improve patiets ability to perform ADLs    10/ nc min Therapeutic Exercise:  [x]  See flow sheet   Modalities Rationale:     decrease pain, increase tissue extensibility, decrease trigger point density and increase stability within muscle to improve patient's ability to perform ADLs/ return to PLOF      Billed With/As:   [] TE   [x] MT   [] Neuro   [x] modalities Patient Education: [x] Review HEP    [] Progressed/Changed HEP based on:   [] positioning   [] body mechanics   [x] indication/ precautions/ expectations of DN    [] heat/ice application    [] other:          OTHER OBJECTIVE/FUNCTIONAL MEASURES:    Dry Needling Procedure Note    Dry Needle Session Number:  1    Procedure: An intramuscular manual therapy (dry needling) and a neuro-muscular re-education treatment was done to deactivate myofascial trigger points, with a 15/30 gauge solid filament needle, under aseptic technique. Indication(s): [x] Muscle spasms [x] Myalgia/Myositis  [] Muscle cramps      [] Muscle imbalances [] TMD (TMJ) [x] Myofascial pain & dysfunction     [] Other: __    Chart reviewed for the following:  I, Marcy Closs, PT, have reviewed the medical history, summary list and precautions/contraindications for Corrinne Chough.     TIME OUT performed immediately prior to start of procedure:  121 (enter time the timeout was completed)  I, Marcy Closs, PT, have performed the following reviews on Corrinne Chough prior to the start of the session:      [x] Patient was identified by name and date of birth    [x] Agreement on all muscles being treated was verified   [x] Purpose of dry needling, side effects, possible complications, risks and benefits were explained to the patient   [x] Procedure site(s) verified  [x] Patient was positioned for comfort and draped for privacy  [x] Informed Consent was signed (initial visit) and verified verbally (subsequent visits)  [x] Patient was instructed on the need to report the use of blood thinners and/or immunosuppressant medications  [x] How to respond to possible adverse effects of treatment  [x] Self treatment of post needling soreness: ice, heat (moist heat, heat wraps) and stretching  [x] Opportunity was given to ask any questions, all questions were answered            Treatment:  The following muscles were treated today (needle insertion with with direct ESTIM):    Right:    Left: QL, piriformis, glut med     Patients response to todays treatment:   [x]  LTRs  [x]  Muscle Relaxation  []  Pain Relief  []  Decreased Tinnitus  []  Decreased HAs [x]  Post needling soreness []  Increased ROM   []  Other:           Post Treatment Pain Level (on 0 to 10) scale:   0  / 10     ASSESSMENT  Assessment/Changes in Function:     Good tolerance for DNing without increased pain     []  See Progress Note/Recertification   Patient will continue to benefit from skilled PT services to modify and progress therapeutic interventions, address functional mobility deficits, address ROM deficits, address strength deficits, analyze and address soft tissue restrictions and analyze and cue movement patterns to attain remaining goals.    Progress toward goals / Updated goals:    \"looser\" after [address]  [x]  Upgrade activities as tolerated YES  Continue plan of care   []  Discharge due to :    []  Other:      Therapist: Ross Meza, PT    Date: 8/3/2020 Time: 9:45 AM     Future Appointments   Date Time Provider Deanna Yanez   8/5/2020 10:45 AM Dashawn Laws, [facility]   8/11/2020 10:30 AM Tremaine Castro, PT [facility]   8/14/2020  9:45 AM Dashawn Laws, PT [facility]   8/17/2020 10:45 AM Dashawn Laws, PT [facility]   8/19/2020 10:45 AM Dashawn Laws, PT [facility]   8/24/2020  9:30 AM Tremaine Castro, PT [facility]   8/27/2020  9:45 AM Tremaine Castro, PT [facility]

## 2020-08-05 ENCOUNTER — HOSPITAL ENCOUNTER (OUTPATIENT)
Dept: PHYSICAL THERAPY | Age: 80
Discharge: HOME | End: 2020-08-05
Payer: MEDICARE

## 2020-08-05 PROCEDURE — 97110 THERAPEUTIC EXERCISES: CPT

## 2020-08-05 PROCEDURE — 97014 ELECTRIC STIMULATION THERAPY: CPT

## 2020-08-05 PROCEDURE — 97140 MANUAL THERAPY 1/> REGIONS: CPT

## 2020-08-05 NOTE — PROGRESS NOTES
PHYSICAL THERAPY - DAILY TREATMENT NOTE     Patient Name: Ifrah Cobian        Date: 2020  : 1940   YES Patient  Verified  Visit #:     Insurance: Payor: Gerhardt Hinders / Plan: [place] MEDICARE PART A & B / Product Type: Medicare /      In time: 9857 Out time: 1140   Total Treatment Time: 55     Medicare/BCBS Time Tracking (below)   Total Timed Codes (min):  45 1:1 Treatment Time: 40     TREATMENT AREA =  Low back pain [M54.5]    SUBJECTIVE    Pain Level (on 0 to 10 scale): 3 / 10   Medication Changes/New allergies or changes in medical history, any new surgeries or procedures? NO    If yes, update Summary List   Subjective Functional Status/Changes:  []  No changes reported       Functional improvements: Pt reports overall progress noted with standing, sitting for ADLs, still limited for work with driving,   Functional impairments: Pain with work and IADLs.          OBJECTIVE  Modalities Rationale:     decrease pain to improve patient's ability to perform work, ADLs with less pain     15 min [x] Estim, type/location: Lumbar in supine with LE elevation                                    []  att     [x]  unatt     []  w/US     []  w/ice    []  w/heat    min []  Mechanical Traction: type/lbs                   []  pro   []  sup   []  int   []  cont    []  before manual    []  after manual    min []  Ultrasound, settings/location:      min []  Iontophoresis w/ dexamethasone, location:                                               []  take home patch       []  in clinic    min []  Ice     []  Heat    location/position:     min []  Vasopneumatic Device, press/temp:     min []  Other:    [x] Skin assessment post-treatment (if applicable):    [x]  intact    []  redness- no adverse reaction     []redness  adverse reaction:      30/25 min Therapeutic Exercise:  [x]  See flow sheet   Rationale:      increase ROM and increase strength to improve the patients ability to perform work, ADLs with less pain       15 min Manual Therapy: Technique:      [x] S/DTM []IASTM []PROM [] Passive Stretching   [x]manual TPR    [x]Jt manipulation:Gr I [x] II [x]  III [] IV[] V[]  Treatment Area: STM and TPR to L QL, piriformis, lumbar paraspinals   Rationale:      decrease pain, increase ROM and increase tissue extensibility to improve patient's ability to work, perform ADLs with less pain      Billed With/As:   [x] TE   [] TA   [] Neuro   [] Self Care Patient Education: [x] Review HEP    [] Progressed/Changed HEP based on:   [] positioning   [] body mechanics   [] transfers   [] heat/ice application    [] other:        Other Objective/Functional Measures: Added rows, extension with TB to HEP with orange and red TB, emphasis on neutral spine during UE movement. Post Treatment Pain Level (on 0 to 10) scale:   1 / 10     ASSESSMENT    Assessment/Changes in Function:     Pt with good response to theraband resistance activity, issued for HEP. []  See Progress Note/Recertification   Patient will continue to benefit from skilled PT services to modify and progress therapeutic interventions, address functional mobility deficits, address ROM deficits, address strength deficits, analyze and address soft tissue restrictions, analyze and modify body mechanics/ergonomics and assess and modify postural abnormalities to attain remaining goals. Progress toward goals / Updated goals:    1.  Pt will demo understanding of and compliance with DC HEP to improve symptoms--good progress with pt requiring min A currently.   2.  Pt will demonstrate ability to perform amb in community 30 min with 3/10 LBP--3/10 for 15 minutes, good progress        PLAN    [x]  Upgrade activities as tolerated YES Continue plan of care   []  Discharge due to :    []  Other:      Therapist: Gene Lombardi, PT    Date: 8/5/2020 Time: 11 AM     Future Appointments   Date Time Provider Deanna Yanez   8/5/2020 10:45 AM Meg Tirado, PT [facility]   8/11/2020 10:30 AM Shahram Stout, PT [facility]   8/14/2020  9:45 AM Trinda Fabry, PT [facility]   8/17/2020 10:45 AM Trinda Fabry, PT [facility]   8/19/2020 10:45 AM Trinda Fabry, PT [facility]   8/24/2020  9:30 AM Shahram Stout, PT [facility]   8/27/2020  9:45 AM Shahram Stout, PT [facility]

## 2020-08-11 ENCOUNTER — HOSPITAL ENCOUNTER (OUTPATIENT)
Dept: PHYSICAL THERAPY | Age: 80
Discharge: HOME | End: 2020-08-11
Payer: MEDICARE

## 2020-08-11 PROCEDURE — 97032 APPL MODALITY 1+ESTIM EA 15: CPT

## 2020-08-11 PROCEDURE — 97140 MANUAL THERAPY 1/> REGIONS: CPT

## 2020-08-11 PROCEDURE — 20561 NDL INSJ W/O NJX 3+ MUSC: CPT

## 2020-08-11 NOTE — PROGRESS NOTES
PHYSICAL THERAPY - DAILY TREATMENT NOTE - DRY NEEDLE NOTE    Patient Name: Marcelino Lopez        Date: 2020  : 1940   YES  Patient  Verified  Visit #:   13   of     Insurance: Payor: Bisi Laureano / Plan: [place] MEDICARE PART A & B / Product Type: Medicare /      In time: 1534 Out time: 1130   Total Treatment Time: 50     Medicare Time Tracking (below)   Total Timed Codes (min):  30 1:1 Treatment Time:  35     TREATMENT AREA = Low back pain [M54.5]    SUBJECTIVE  Pain Level (on 0 to 10 scale):  4-5  / 10   Medication Changes/New allergies or changes in medical history, any new surgeries or procedures? NO    If yes, update Summary List   Subjective Functional Status/Changes:  []  No changes reported     Pain varies.   Not sure if DNing is helpful          OBJECTIVE  Modalities Rationale:     decrease pain, increase tissue extensibility, decrease trigger point density and increase stability/ ROM within muscle to improve patient's ability to perform ADLs/ return to PLOF  10 min [x] Estim, type/location: Direct using Pointer Excel II ( between 1-16 HZ) to DN                                     [x]  att     []  unatt     [x]  W/dry needling     []  w/ice    []  w/heat    min []  Mechanical Traction: type/lbs                   []  pro   []  sup   []  int   []  cont    []  before manual    []  after manual    min []  Ultrasound, settings/location:      min []  Iontophoresis w/ dexamethasone, location:                                               []  take home patch       []  in clinic   10 min [x]  Ice     []  Heat    location/position:     min []  Vasopneumatic Device, press/temp:     min []  Other:    [x] Skin assessment post-treatment (if applicable):    [x]  intact    []  redness- no adverse reaction     []redness  adverse reaction:        10 min Manual Therapy: Palpation, assessment of TP  -(needle insertion time not included)    Rationale:     decrease pain, increase tissue extensibility, decrease trigger point density and increase stability within muscle to improve patient's ability to perform ADLs/ return to PLOF    Select one untimed code below based on number of muscle groups needled:  []  CPT 76228:  needle insertion(s) without injection(s); 1 or 2 muscle(s)  [x]  CPT 35617:  needle insertion(s) without injection(s); 3 or more muscles. 5 Min--dry needle insertion   Rationale:      decrease pain, increase tissue extensibility, and decrease trigger points to improve patiets ability to perform ADLs    10 min Therapeutic Exercise:  [x]  See flow sheet   Modalities Rationale:     decrease pain, increase tissue extensibility, decrease trigger point density and increase stability within muscle to improve patient's ability to perform ADLs/ return to PLOF      Billed With/As:   [] TE   [x] MT   [] Neuro   [x] modalities Patient Education: [x] Review HEP    [] Progressed/Changed HEP based on:   [] positioning   [] body mechanics   [x] indication/ precautions/ expectations of DN    [] heat/ice application    [] other:          OTHER OBJECTIVE/FUNCTIONAL MEASURES:    Dry Needling Procedure Note    Dry Needle Session Number:  2    Procedure: An intramuscular manual therapy (dry needling) and a neuro-muscular re-education treatment was done to deactivate myofascial trigger points, with a 15/30 gauge solid filament needle, under aseptic technique. Indication(s): [x] Muscle spasms [x] Myalgia/Myositis  [] Muscle cramps      [] Muscle imbalances [] TMD (TMJ) [x] Myofascial pain & dysfunction     [] Other: __    Chart reviewed for the following:  Gisella MATUTE PT, have reviewed the medical history, summary list and precautions/contraindications for Kadie Mohamud.     TIME OUT performed immediately prior to start of procedure:  4076 (enter time the timeout was completed)  Gisella MATUTE PT, have performed the following reviews on Kadie Mohamud prior to the start of the session:      [x] Patient was identified by name and date of birth    [x] Agreement on all muscles being treated was verified   [x] Purpose of dry needling, side effects, possible complications, risks and benefits were explained to the patient   [x] Procedure site(s) verified  [x] Patient was positioned for comfort and draped for privacy  [x] Informed Consent was signed (initial visit) and verified verbally (subsequent visits)  [x] Patient was instructed on the need to report the use of blood thinners and/or immunosuppressant medications  [x] How to respond to possible adverse effects of treatment  [x] Self treatment of post needling soreness: ice, heat (moist heat, heat wraps) and stretching  [x] Opportunity was given to ask any questions, all questions were answered            Treatment:  The following muscles were treated today (needle insertion with with direct ESTIM):    Right: QL, MF- L4   Left: QL, MF- L4, glut med, piriformis     Patients response to todays treatment:   [x]  LTRs  [x]  Muscle Relaxation  []  Pain Relief  []  Decreased Tinnitus  []  Decreased HAs [x]  Post needling soreness []  Increased ROM   []  Other:           Post Treatment Pain Level (on 0 to 10) scale:   4-5  / 10     ASSESSMENT  Assessment/Changes in Function:     Good tolerance for dry needling. No increase in pain reported    Mod limitation bilat hip ER     []  See Progress Note/Recertification   Patient will continue to benefit from skilled PT services to modify and progress therapeutic interventions, address functional mobility deficits, address ROM deficits, address strength deficits, analyze and address soft tissue restrictions and analyze and cue movement patterns to attain remaining goals.    Progress toward goals / Updated goals:    Slow progress reported     PLAN  [x]  Upgrade activities as tolerated YES  Continue plan of care   []  Discharge due to :    []  Other:      Therapist: Mattie Vines, PT    Date: 8/11/2020 Time: 10:35 AM     Future Appointments   Date Time Provider Deanna Renata   8/14/2020  9:45 AM Inderjit Santiago, PT [facility]   8/17/2020 10:45 AM Inderjit Santiago, PT [facility]   8/19/2020 10:45 AM Inderjit Santiago, PT [facility]   8/24/2020  9:30 AM Fraser Krabbe, PT [facility]   8/27/2020  9:45 AM Fraser Krabbe, PT [facility]

## 2020-08-14 ENCOUNTER — HOSPITAL ENCOUNTER (OUTPATIENT)
Dept: PHYSICAL THERAPY | Age: 80
Discharge: HOME | End: 2020-08-14
Payer: MEDICARE

## 2020-08-14 PROCEDURE — 97140 MANUAL THERAPY 1/> REGIONS: CPT

## 2020-08-14 PROCEDURE — 97110 THERAPEUTIC EXERCISES: CPT

## 2020-08-14 PROCEDURE — 97014 ELECTRIC STIMULATION THERAPY: CPT

## 2020-08-14 NOTE — PROGRESS NOTES
PHYSICAL THERAPY - DAILY TREATMENT NOTE     Patient Name: Weston Knowles        Date: 2020  : 1940   YES Patient  Verified  Visit #:     Insurance: Payor: Rubin Bloch / Plan: [place] MEDICARE PART A & B / Product Type: Medicare /      In time: 702 Out time: 1055   Total Treatment Time: 60     Medicare/BCBS Time Tracking (below)   Total Timed Codes (min):  45 1:1 Treatment Time: 45     TREATMENT AREA =  Low back pain [M54.5]    SUBJECTIVE    Pain Level (on 0 to 10 scale): 3 / 10   Medication Changes/New allergies or changes in medical history, any new surgeries or procedures? NO    If yes, update Summary List   Subjective Functional Status/Changes:  []  No changes reported       Pt with increased response to needling recent visit. Feeling less pain overall. Doing HEP but still feeling weaker than prior to pain onset.      OBJECTIVE  Modalities Rationale:     decrease pain to improve patient's ability to perform work, ADLs with less pain     15 min [x] Estim, type/location: Lumbar in supine with LE elevation                                    []  att     [x]  unatt     []  w/US     []  w/ice    []  w/heat    min []  Mechanical Traction: type/lbs                   []  pro   []  sup   []  int   []  cont    []  before manual    []  after manual    min []  Ultrasound, settings/location:      min []  Iontophoresis w/ dexamethasone, location:                                               []  take home patch       []  in clinic    min []  Ice     []  Heat    location/position:     min []  Vasopneumatic Device, press/temp:     min []  Other:    [x] Skin assessment post-treatment (if applicable):    [x]  intact    []  redness- no adverse reaction     []redness  adverse reaction:      40 min Therapeutic Exercise:  [x]  See flow sheet   Rationale:      increase ROM and increase strength to improve the patients ability to perform work, ADLs with less pain       5 min Manual Therapy: Technique:      [x] S/DTM []IASTM []PROM [] Passive Stretching   [x]manual TPR    [x]Jt manipulation:Gr I [x] II [x]  III [] IV[] V[]  Treatment Area: TPR to L QL   Rationale:      decrease pain, increase ROM and increase tissue extensibility to improve patient's ability to work, perform ADLs with less pain      Billed With/As:   [x] TE   [] TA   [] Neuro   [] Self Care Patient Education: [x] Review HEP    [] Progressed/Changed HEP based on:   [] positioning   [] body mechanics   [] transfers   [] heat/ice application    [] other:        Other Objective/Functional Measures: Added bent over rows, triceps ext, shoulder ext in 1/2 prone. Added 1/2 prone TA draws, hip hinging. Post Treatment Pain Level (on 0 to 10) scale:   1 / 10     ASSESSMENT    Assessment/Changes in Function:     Pt with good response to TA draw, core strengthening progression. Significant improvement in hip hinging, rows with improved posture, body mechanics. []  See Progress Note/Recertification   Patient will continue to benefit from skilled PT services to modify and progress therapeutic interventions, address functional mobility deficits, address ROM deficits, address strength deficits, analyze and address soft tissue restrictions, analyze and modify body mechanics/ergonomics and assess and modify postural abnormalities to attain remaining goals. Progress toward goals / Updated goals:    1.  Pt will demo understanding of and compliance with DC HEP to improve symptoms--good progress with pt requiring min A to mod A with form, new exercises with significant verbal and tactile cues for form. Will reassess I at next visit.   2.  Pt will demonstrate ability to perform amb in community 30 min with 3/10 LBP--5/10 for 20 minutes, good progress        PLAN    [x]  Upgrade activities as tolerated YES Continue plan of care   []  Discharge due to :    []  Other:      Therapist: Gene Lombardi, PT    Date: 8/14/2020 Time: 11:14 AM     Future Appointments   Date Time Provider Deanna Yanez   8/14/2020  9:45 AM Arthor Nurse, PT [facility]   8/17/2020 10:45 AM Arthor Nurse, PT [facility]   8/19/2020 10:45 AM Arthor Nurse, PT [facility]   8/24/2020  9:30 AM Nia Shepherd, PT [facility]   8/27/2020  9:45 AM Nia Shepherd, PT [facility]

## 2020-08-17 ENCOUNTER — HOSPITAL ENCOUNTER (OUTPATIENT)
Dept: PHYSICAL THERAPY | Age: 80
Discharge: HOME | End: 2020-08-17
Payer: MEDICARE

## 2020-08-17 PROCEDURE — 97140 MANUAL THERAPY 1/> REGIONS: CPT

## 2020-08-17 PROCEDURE — 97014 ELECTRIC STIMULATION THERAPY: CPT

## 2020-08-17 PROCEDURE — 97110 THERAPEUTIC EXERCISES: CPT

## 2020-08-17 NOTE — PROGRESS NOTES
PHYSICAL THERAPY - DAILY TREATMENT NOTE     Patient Name: Teresa Lopez        Date: 2020  : 1940   YES Patient  Verified  Visit #:   15/18  Insurance: Payor: Verena Gal / Plan: [place] MEDICARE PART A & B / Product Type: Medicare /      In time:  Out time:     Total Treatment Time: 45     Medicare/BCBS Time Tracking (below)   Total Timed Codes (min):  30 1:1 Treatment Time: 30     TREATMENT AREA =  Low back pain [M54.5]    SUBJECTIVE    Pain Level (on 0 to 10 scale): 0 / 10   Medication Changes/New allergies or changes in medical history, any new surgeries or procedures? NO    If yes, update Summary List   Subjective Functional Status/Changes:  []  No changes reported     No pain this am at rest.  Had pain with amb this weekend and with standing for IADLs, work. Good response per pt to resistance training and reduced pain.        OBJECTIVE  Modalities Rationale:     decrease pain to improve patient's ability to perform work, ADLs with less pain     15 min [x] Estim, type/location: Lumbar in supine with LE elevation                                    []  att     [x]  unatt     []  w/US     []  w/ice    []  w/heat    min []  Mechanical Traction: type/lbs                   []  pro   []  sup   []  int   []  cont    []  before manual    []  after manual    min []  Ultrasound, settings/location:      min []  Iontophoresis w/ dexamethasone, location:                                               []  take home patch       []  in clinic    min []  Ice     []  Heat    location/position:     min []  Vasopneumatic Device, press/temp:     min []  Other:    [x] Skin assessment post-treatment (if applicable):    [x]  intact    []  redness- no adverse reaction     []redness  adverse reaction:      20 min Therapeutic Exercise:  [x]  See flow sheet   Rationale:      increase ROM and increase strength to improve the patients ability to perform work, ADLs with less pain       10 min Manual Therapy: Technique: [x] S/DTM []IASTM []PROM [] Passive Stretching   [x]manual TPR    [x]Jt manipulation:Gr I [x] II [x]  III [] IV[] V[]  Treatment Area: TPR to L QL, gluteus medius and lumbar paraspinals   Rationale:      decrease pain, increase ROM and increase tissue extensibility to improve patient's ability to work, perform ADLs with less pain      Billed With/As:   [x] TE   [] TA   [] Neuro   [] Self Care Patient Education: [x] Review HEP    [] Progressed/Changed HEP based on:   [] positioning   [] body mechanics   [] transfers   [] heat/ice application    [] other:        Other Objective/Functional Measures:  Re-educated and video instruction performed to assist with HEP I. Post Treatment Pain Level (on 0 to 10) scale:   1 / 10     ASSESSMENT    Assessment/Changes in Function:     See Re-certification. []  See Progress Note/Recertification   Patient will continue to benefit from skilled PT services to modify and progress therapeutic interventions, address functional mobility deficits, address ROM deficits, address strength deficits, analyze and address soft tissue restrictions, analyze and modify body mechanics/ergonomics and assess and modify postural abnormalities to attain remaining goals.       Progress toward goals / Updated goals:    See Re-certification.        PLAN    [x]  Upgrade activities as tolerated YES Continue plan of care   []  Discharge due to :    []  Other:      Therapist: Enzo Coelho PT    Date: 8/17/2020 Time: 11:44 AM     Future Appointments   Date Time Provider Deanna Yanez   8/19/2020 10:45 AM Corry Brumfield [facility]   8/24/2020  9:30 AM Nessadante Hidalgo PT [facility]   8/27/2020  9:45 AM Phu Hidalgo PT [facility]   9/2/2020 10:30 AM Cindi Hernandez PT [facility]   9/9/2020 12:45 PM Cindi Hernandez PT [facility]   9/14/2020 10:30 AM Cindi Hernandez, PT [facility]   9/21/2020 10:30 AM Cindi Hernandez, PT [facility]   9/28/2020 10:30 AM Cindi Hernandez, PT [facility]

## 2020-08-17 NOTE — PROGRESS NOTES
[facility] PHYSICAL THERAPY AT [address] [facility], [address] [place] [address], [address], [address] [place] [address] - Phone: [phone]  Fax: [phone] THERAPY          Patient Name: Lydia Andrade : 1940   Treatment/Medical Diagnosis: Low back pain [M54.5]   Onset Date:     Referral Source: Yajaira Mazariegos MD [facility]): 40   Prior Hospitalization: See Medical History Provider #: [id number]   Prior Level of Function: Symptom free, working out regularly at the gym   Comorbidities: Thyroid problems, Breast Cancer in 2013, Scoliosis   Medications: Verified on Patient Summary List   Visits from [facility]: 12 Missed Visits: 0     Previous Goals:  1. Pt will demo understanding of and compliance with DC HEP to improve symptoms. 2.  Pt will demonstrate ability to perform amb in community 30 min with 3/10 LBP. 3.  Pt will report +4 on GROC scale to indicate improved function and be prepared to DC to HEP. Prior Level/Current Level:  1) Prior Level: Pt I with initial program   Current Level: Pt with min A with progression of strengthening activity. Goal Met? Good progress  2) Prior Level: 8/10   Current Level: 5/10 with 30 min amb, with pt reporting unable to perform > 30 min secondary to pain. Goal Met? no  3) Prior Level: n/a   Current Level: +3, somewhat better   Goal Met? No, good progress    Key Functional Changes/Progress: Pt with improved pain level and return to increased activity since last update. She is noting improvements with return to tx and dry needling, electrical stim tx. She notes significant improvement in ROM with trunk flexion and SB B with <2/10 pain, improving her function and transfers. She notes ability to amb now to 30 min with improving symptoms but needs to stop at that point secondary to pain. Her overall pain level has improved to < 3/10 with ADLs and work activity.   Her strength is improved with L hip abduction at 4/5 from 3+/5. She is concerned related to amb status and current strength in L hip. She is returning to modified strengthening program at this time secondary to good progress. Would benefit from progression of program with therapist supervision 1x/week with pt to perform I with HEP in between sessions. Problem List: pain affecting function, decrease ROM, decrease strength, decrease ADL/ functional abilitiies, decrease activity tolerance, decrease flexibility/ joint mobility and decrease transfer abilities   Treatment Plan may include any combination of the following: Therapeutic exercise, Therapeutic activities, Neuromuscular re-education, Physical agent/modality, Gait/balance training, Manual therapy, Patient education, Self Care training, Functional mobility training and Stair training  Patient Goal(s) has been updated and includes: To have less pain, ability to perform amb and work with less limitations    Goals for this certification period include and are to be achieved in   4  weeks:  1. Pt will demo understanding of and compliance with DC HEP to improve symptoms. 2.  Pt will demonstrate ability to perform amb in community 30 min with 3/10 LBP. 3.  Pt will report +5 on GROC scale to indicate improved function and be prepared to DC to HEP. Frequency / Duration:   Patient to be seen   1-2   times per week for   4    weeks:    Assessments/Recommendations:   Pt with good progress with reduced pain in lumbar region and reduced radicular symptoms. Pt with continued limitations in strength, would benefit from 1x/week x 4 weeks to progress strength gains and perform dry needling, modalities PRN for pain management. If you have any questions/comments please contact us directly at [phone]. Thank you for allowing us to assist in the care of your patient.     Therapist Signature: Karlo Veras PT Date: 6/33/8026   Certification Period:    8/17/-9/16/20   Time: 11 AM   NOTE TO PHYSICIAN: PLEASE COMPLETE THE ORDERS BELOW AND FAX TO   [facility] at [place]: [phone]. If you are unable to process this request in 24 hours please contact our office: [phone].    ___ I have read the above report and request that my patient continue as recommended.   ___ I have read the above report and request that my patient continue therapy with the following changes/special instructions: ________________________________________________   ___ I have read the above report and request that my patient be discharged from therapy.      Physician Signature:        Date:       Time:

## 2020-08-19 ENCOUNTER — APPOINTMENT (OUTPATIENT)
Dept: PHYSICAL THERAPY | Age: 80
End: 2020-08-19
Payer: MEDICARE

## 2020-08-20 ENCOUNTER — HOSPITAL ENCOUNTER (OUTPATIENT)
Dept: PHYSICAL THERAPY | Age: 80
Discharge: HOME | End: 2020-08-20
Payer: MEDICARE

## 2020-08-20 PROCEDURE — 97110 THERAPEUTIC EXERCISES: CPT

## 2020-08-20 PROCEDURE — 97140 MANUAL THERAPY 1/> REGIONS: CPT

## 2020-08-20 PROCEDURE — 97014 ELECTRIC STIMULATION THERAPY: CPT

## 2020-08-20 NOTE — PROGRESS NOTES
PHYSICAL THERAPY - DAILY TREATMENT NOTE     Patient Name: Rosibel Zhao        Date: 2020  : 1940   YES Patient  Verified  Visit #:     Insurance: Payor: Tiffanie Jones / Plan: [place] MEDICARE PART A & B / Product Type: Medicare /      In time: 8674 Out time: 043   Total Treatment Time: 45     Medicare/BCBS Time Tracking (below)   Total Timed Codes (min):  45 1:1 Treatment Time:  30     TREATMENT AREA =  Low back pain [M54.5]    SUBJECTIVE    Pain Level (on 0 to 10 scale):  3  / 10   Medication Changes/New allergies or changes in medical history, any new surgeries or procedures? NO    If yes, update Summary List   Subjective Functional Status/Changes:  []  No changes reported       Functional improvements: *Able to walk 35 min prior to needing to stop because of pain. Less pain waking her up at night.   Functional impairments: Pain with walking > 35 min, work activity          OBJECTIVE  Modalities Rationale:     decrease pain to improve patient's ability to perform ADLs     15 min [x] Estim, type/location: Lumbar in supine                                     []  att     [x]  unatt     []  w/US     []  w/ice    []  w/heat    min []  Mechanical Traction: type/lbs                   []  pro   []  sup   []  int   []  cont    []  before manual    []  after manual    min []  Ultrasound, settings/location:      min []  Iontophoresis w/ dexamethasone, location:                                               []  take home patch       []  in clinic    min []  Ice     []  Heat    location/position:     min []  Vasopneumatic Device, press/temp:     min []  Other:    [] Skin assessment post-treatment (if applicable):    []  intact    []  redness- no adverse reaction     []redness  adverse reaction:      20 min Therapeutic Exercise:  [x]  See flow sheet   Rationale:      increase ROM and increase strength to improve the patients ability to perform ADLs       15 min Manual Therapy: Technique:      [] S/DTM []IASTM []PROM [] Passive Stretching   [x]manual TPR    [x]Jt manipulation:Gr I [] II []  III [] IV[] V[]  Treatment Area:     Rationale:      decrease pain and increase ROM to improve patient's ability to perform ADLs. Billed With/As:   [x] TE   [] TA   [] Neuro   [] Self Care Patient Education: [x] Review HEP    [] Progressed/Changed HEP based on:   [] positioning   [] body mechanics   [] transfers   [] heat/ice application    [] other:        Other Objective/Functional Measures:    Reviewed and progressed HEP with squatting, leg press, wall slide. Post Treatment Pain Level (on 0 to 10) scale:   0  / 10     ASSESSMENT    Assessment/Changes in Function:     Pt with ed on form and ed for lifting, sqautting mechanics     []  See Progress Note/Recertification   Patient will continue to benefit from skilled PT services to modify and progress therapeutic interventions, address functional mobility deficits, address ROM deficits, address strength deficits, analyze and address soft tissue restrictions and analyze and cue movement patterns to attain remaining goals. Progress toward goals / Updated goals:    Good progress, reduce frequency of visits secondary to good progress, work toward newly established goals.      PLAN    [x]  Upgrade activities as tolerated YES Continue plan of care   []  Discharge due to :    []  Other:      Therapist: Irvin Rodriguez PT    Date: 8/20/2020 Time: 10AM     Future Appointments   Date Time Provider Deanna Yanez   8/20/2020 10:15 AM Chemo Gupta, PT [facility]   8/24/2020  9:30 AM Lenora Tafoya PT [facility]   8/27/2020  9:45 AM Lenora Tafoya PT [facility]   9/2/2020 10:30 AM Chemo Gupta PT [facility]   9/9/2020 12:45 PM Chemo Gupta, PT [facility]   9/14/2020 10:30 AM Chemo Gupta PT [facility]   9/21/2020 10:30 AM Chemo Gupta, PT [facility]   9/28/2020 10:30 AM Chemo Gupta, PT [facility]

## 2020-08-24 ENCOUNTER — HOSPITAL ENCOUNTER (OUTPATIENT)
Dept: PHYSICAL THERAPY | Age: 80
Discharge: HOME | End: 2020-08-24
Payer: MEDICARE

## 2020-08-24 PROCEDURE — 97140 MANUAL THERAPY 1/> REGIONS: CPT

## 2020-08-24 PROCEDURE — 20561 NDL INSJ W/O NJX 3+ MUSC: CPT

## 2020-08-24 PROCEDURE — 97032 APPL MODALITY 1+ESTIM EA 15: CPT

## 2020-08-24 PROCEDURE — 97110 THERAPEUTIC EXERCISES: CPT

## 2020-08-24 NOTE — PROGRESS NOTES
PHYSICAL THERAPY - DAILY TREATMENT NOTE - DRY NEEDLE NOTE    Patient Name: Faith Rader        Date: 2020  : 1940   YES  Patient  Verified  Visit #:   16   of     Insurance: Payor: Armond Crook / Plan: [place] MEDICARE PART A & B / Product Type: Medicare /      In time: 930 Out time: 1030   Total Treatment Time: 55     Medicare Time Tracking (below)   Total Timed Codes (min):  40 1:1 Treatment Time:  45     TREATMENT AREA = Low back pain [M54.5]    SUBJECTIVE  Pain Level (on 0 to 10 scale):  0  / 10   Medication Changes/New allergies or changes in medical history, any new surgeries or procedures? NO    If yes, update Summary List   Subjective Functional Status/Changes:  []  No changes reported     Comes and goes. Significant tightness/ fatigue last night.   Catches on left when its there          OBJECTIVE  Modalities Rationale:     decrease pain, increase tissue extensibility, decrease trigger point density and increase stability/ ROM within muscle to improve patient's ability to perform ADLs/ return to PLOF  15 min [x] Estim, type/location: Direct using Pointer Excel II ( between 1-16 HZ) to DN                                     [x]  att     []  unatt     [x]  W/dry needling     []  w/ice    []  w/heat    min []  Mechanical Traction: type/lbs                   []  pro   []  sup   []  int   []  cont    []  before manual    []  after manual    min []  Ultrasound, settings/location:      min []  Iontophoresis w/ dexamethasone, location:                                               []  take home patch       []  in clinic   10 min [x]  Ice     []  Heat    location/position:     min []  Vasopneumatic Device, press/temp:     min []  Other:    [x] Skin assessment post-treatment (if applicable):    [x]  intact    []  redness- no adverse reaction     []redness  adverse reaction:        10 min Manual Therapy: Palpation, assessment of TP  -(needle insertion time not included)    Rationale:     decrease pain, increase tissue extensibility, decrease trigger point density and increase stability within muscle to improve patient's ability to perform ADLs/ return to PLOF    Select one untimed code below based on number of muscle groups needled:  []  CPT 38898:  needle insertion(s) without injection(s); 1 or 2 muscle(s)  [x]  CPT 33468:  needle insertion(s) without injection(s); 3 or more muscles. 5 Min--dry needle insertion   Rationale:      decrease pain, increase tissue extensibility, and decrease trigger points to improve patiets ability to perform ADLs    15 min Therapeutic Exercise:  [x]  See flow sheet   Modalities Rationale:     decrease pain, increase tissue extensibility, decrease trigger point density and increase stability within muscle to improve patient's ability to perform ADLs/ return to PLOF      Billed With/As:   [] TE   [x] MT   [] Neuro   [x] modalities Patient Education: [x] Review HEP    [] Progressed/Changed HEP based on:   [] positioning   [] body mechanics   [x] indication/ precautions/ expectations of DN    [] heat/ice application    [] other:          OTHER OBJECTIVE/FUNCTIONAL MEASURES:    Dry Needling Procedure Note    Dry Needle Session Number:  3    Procedure: An intramuscular manual therapy (dry needling) and a neuro-muscular re-education treatment was done to deactivate myofascial trigger points, with a 15/30 gauge solid filament needle, under aseptic technique. Indication(s): [x] Muscle spasms [x] Myalgia/Myositis  [] Muscle cramps      [] Muscle imbalances [] TMD (TMJ) [x] Myofascial pain & dysfunction     [] Other: __    Chart reviewed for the following:  Keven MATUTE PT, have reviewed the medical history, summary list and precautions/contraindications for [place].     TIME OUT performed immediately prior to start of procedure:  792 (enter time the timeout was completed)  Keven MATUTE PT, have performed the following reviews on [place] prior to the start of the session:      [x] Patient was identified by name and date of birth    [x] Agreement on all muscles being treated was verified   [x] Purpose of dry needling, side effects, possible complications, risks and benefits were explained to the patient   [x] Procedure site(s) verified  [x] Patient was positioned for comfort and draped for privacy  [x] Informed Consent was signed (initial visit) and verified verbally (subsequent visits)  [x] Patient was instructed on the need to report the use of blood thinners and/or immunosuppressant medications  [x] How to respond to possible adverse effects of treatment  [x] Self treatment of post needling soreness: ice, heat (moist heat, heat wraps) and stretching  [x] Opportunity was given to ask any questions, all questions were answered            Treatment:  The following muscles were treated today (needle insertion with with direct ESTIM):    Right: QL, MF- L4   Left: QL, MF- L4, glut med     Patients response to todays treatment:   [x]  LTRs  [x]  Muscle Relaxation  []  Pain Relief  []  Decreased Tinnitus  []  Decreased HAs [x]  Post needling soreness []  Increased ROM   []  Other:           Post Treatment Pain Level (on 0 to 10) scale:   0  / 10     ASSESSMENT  Assessment/Changes in Function:     Encouraged inc freq of HEP to manage pain level spikes     []  See Progress Note/Recertification   Patient will continue to benefit from skilled PT services to modify and progress therapeutic interventions, address functional mobility deficits, address ROM deficits, address strength deficits, analyze and address soft tissue restrictions and analyze and cue movement patterns to attain remaining goals.    Progress toward goals / Updated goals:    Less pain/ tightness reported after treatment     PLAN  [x]  Upgrade activities as tolerated YES  Continue plan of care   []  Discharge due to :    []  Other:      Therapist: Carina Lomax, PT    Date: 8/24/2020 Time: 9:35 AM     Future Appointments   Date Time Provider Deanna Yanez   8/27/2020  9:45 AM Dwayne Hernández, PT [facility]   9/2/2020 10:30 AM Juli Diane, PT [facility]   9/9/2020 12:45 PM Juli Diane, PT [facility]   9/14/2020 10:30 AM Juli Diane, PT [facility]   9/21/2020 10:30 AM Juli Diane, PT [facility]   9/28/2020 10:30 AM Juli Diane, PT [facility]

## 2020-08-27 ENCOUNTER — APPOINTMENT (OUTPATIENT)
Dept: PHYSICAL THERAPY | Age: 80
End: 2020-08-27
Payer: MEDICARE

## 2020-09-02 ENCOUNTER — APPOINTMENT (OUTPATIENT)
Dept: PHYSICAL THERAPY | Age: 80
End: 2020-09-02
Payer: MEDICARE

## 2020-09-09 ENCOUNTER — HOSPITAL ENCOUNTER (OUTPATIENT)
Dept: PHYSICAL THERAPY | Age: 80
Discharge: HOME | End: 2020-09-09
Payer: MEDICARE

## 2020-09-09 PROCEDURE — 97140 MANUAL THERAPY 1/> REGIONS: CPT

## 2020-09-09 PROCEDURE — 97110 THERAPEUTIC EXERCISES: CPT

## 2020-09-09 PROCEDURE — 97014 ELECTRIC STIMULATION THERAPY: CPT

## 2020-09-09 NOTE — PROGRESS NOTES
PHYSICAL THERAPY - DAILY TREATMENT NOTE     Patient Name: Dominga Burns        Date: 2020  : 1940   YES Patient  Verified  Visit #:    Insurance: Payor: Kelli Grove / Plan: [place] MEDICARE PART A & B / Product Type: Medicare /      In time: 1250 Out time: 145   Total Treatment Time: 55     Medicare/BCBS Time Tracking (below)   Total Timed Codes (min):  40 1:1 Treatment Time: 40     TREATMENT AREA =  Low back pain [M54.5]    SUBJECTIVE    Pain Level (on 0 to 10 scale):   10   Medication Changes/New allergies or changes in medical history, any new surgeries or procedures? NO    If yes, update Summary List   Subjective Functional Status/Changes:  []  No changes reported       Functional improvements:  Able to perform HEP, increased ADLs, IADLs with good response. Noting less pain with standing for work activity but has not been able to do progressions of strengthening. More pain today related to increased standing, work activity this week.   Functional impairments:   Pain with walking > 30 min, 20 min work activity          OBJECTIVE  Modalities Rationale:     decrease pain to improve patient's ability to perform ADLs     15 min [x] Estim, type/location: Lumbar in supine                                     []  att     [x]  unatt     []  w/US     []  w/ice    []  w/heat    min []  Mechanical Traction: type/lbs                   []  pro   []  sup   []  int   []  cont    []  before manual    []  after manual    min []  Ultrasound, settings/location:      min []  Iontophoresis w/ dexamethasone, location:                                               []  take home patch       []  in clinic    min []  Ice     []  Heat    location/position:     min []  Vasopneumatic Device, press/temp:     min []  Other:    [x] Skin assessment post-treatment (if applicable):    [x]  intact    []  redness- no adverse reaction     []redness  adverse reaction:      25 min Therapeutic Exercise:  [x]  See flow sheet Rationale:      increase ROM and increase strength to improve the patients ability to perform LE ADLs, work activity       15 min Manual Therapy: Technique:      [] S/DTM []IASTM []PROM [] Passive Stretching   [x]manual TPR    [x]Jt manipulation:Gr I [] II []  III [] IV[] V[]  Treatment Area:  L QL, lumbar paraspinals STM   Rationale:      decrease pain and increase ROM to improve patient's ability to perform ADLs, work activity. Billed With/As:   [x] TE   [] TA   [] Neuro   [] Self Care Patient Education: [x] Review HEP    [] Progressed/Changed HEP based on:   [] positioning   [] body mechanics   [] transfers   [] heat/ice application    [] other:        Other Objective/Functional Measures:    Pt with review of HEP and squatting ed. Pt with TTP at R QL. Post Treatment Pain Level (on 0 to 10) scale:   0  / 10     ASSESSMENT    Assessment/Changes in Function:     Reviewed squatting procedures, form with ADLs, IADLs and work activity. Pt with good response to updated program for squatting, rows to improve strength for ADLs. Pt with 0/10 pain post tx, ed on HEP for pain reduction carryover. []  See Progress Note/Recertification   Patient will continue to benefit from skilled PT services to modify and progress therapeutic interventions, address functional mobility deficits, address ROM deficits, address strength deficits, analyze and address soft tissue restrictions and analyze and cue movement patterns to attain remaining goals. Progress toward goals / Updated goals:     2.  Pt will demonstrate ability to perform amb in community 30 min with 3/10 LBP--15 min currently, fair progress.      PLAN    [x]  Upgrade activities as tolerated YES Continue plan of care   []  Discharge due to :    []  Other:      Therapist: Dyan Araujo, PT    Date: 9/9/2020 Time: 10:23 AM     Future Appointments   Date Time Provider Deanna Yanez   9/9/2020 12:45 PM Ray Henry PT [facility]   9/14/2020 10:30 AM Abdoulaye Tomas Kramer, PT [facility]   9/21/2020 10:30 AM Natasha Garland, PT [facility]   9/28/2020 10:30 AM Natasha Garland, PT [facility]

## 2020-09-14 ENCOUNTER — HOSPITAL ENCOUNTER (OUTPATIENT)
Dept: PHYSICAL THERAPY | Age: 80
Discharge: HOME | End: 2020-09-14
Payer: MEDICARE

## 2020-09-14 PROCEDURE — 97014 ELECTRIC STIMULATION THERAPY: CPT

## 2020-09-14 PROCEDURE — 97140 MANUAL THERAPY 1/> REGIONS: CPT

## 2020-09-14 PROCEDURE — 97110 THERAPEUTIC EXERCISES: CPT

## 2020-09-14 NOTE — PROGRESS NOTES
PHYSICAL THERAPY - DAILY TREATMENT NOTE     Patient Name: Liliana Bourne        Date: 2020  : 1940   YES Patient  Verified  Visit #:    Insurance: Payor: Josephine Segal / Plan: [place] MEDICARE PART A & B / Product Type: Medicare /      In time:  Out time: 113   Total Treatment Time: 55     Medicare/BCBS Time Tracking (below)   Total Timed Codes (min):  40 1:1 Treatment Time: 40     TREATMENT AREA =  Low back pain [M54.5]    SUBJECTIVE    Pain Level (on 0 to 10 scale):  0/ 10   Medication Changes/New allergies or changes in medical history, any new surgeries or procedures? NO    If yes, update Summary List   Subjective Functional Status/Changes:  []  No changes reported     Pt reports she is having no pain this day.   Pt reports she was managing symptoms prior to COVID with gym activity and concerned she will create more symptoms but needs to do resistance training for bone health and does not know which ones to do as she is feeling less pain         OBJECTIVE  Modalities Rationale:     decrease pain to improve patient's ability to perform ADLs     15 min [x] Estim, type/location: Lumbar in supine                                     []  att     [x]  unatt     []  w/US     []  w/ice    []  w/heat    min []  Mechanical Traction: type/lbs                   []  pro   []  sup   []  int   []  cont    []  before manual    []  after manual    min []  Ultrasound, settings/location:      min []  Iontophoresis w/ dexamethasone, location:                                               []  take home patch       []  in clinic    min []  Ice     []  Heat    location/position:     min []  Vasopneumatic Device, press/temp:     min []  Other:    [x] Skin assessment post-treatment (if applicable):    [x]  intact    []  redness- no adverse reaction     []redness  adverse reaction:      25 min Therapeutic Exercise:  [x]  See flow sheet   Rationale:      increase ROM and increase strength to improve the patients ability to perform LE ADLs, work activity       15 min Manual Therapy: Technique:      [] S/DTM []IASTM []PROM [] Passive Stretching   [x]manual TPR    [x]Jt manipulation:Gr I [] II []  III [] IV[] V[]  Treatment Area:  L QL, lumbar paraspinals STM   Rationale:      decrease pain and increase ROM to improve patient's ability to perform ADLs, work activity. Billed With/As:   [x] TE   [] TA   [] Neuro   [] Self Care Patient Education: [x] Review HEP    [] Progressed/Changed HEP based on:   [x] positioning   [x] body mechanics   [] transfers   [] heat/ice application    [x] other: sit<>stand, squatting       Other Objective/Functional Measures:  Modified squatting to 1/4 squat from 1/2 squat. Added hip hinging to train squat with less anterior translation of knee and compression to spine. Post Treatment Pain Level (on 0 to 10) scale:   0  / 10     ASSESSMENT    Assessment/Changes in Function:     Pt with squatting without weight for HEP secondary to knee pain, sit<>Standwith hip hinging. Pt requires min to mod A for verbal and tactile for form. []  See Progress Note/Recertification   Patient will continue to benefit from skilled PT services to modify and progress therapeutic interventions, address functional mobility deficits, address ROM deficits, address strength deficits, analyze and address soft tissue restrictions and analyze and cue movement patterns to attain remaining goals.       Progress toward goals / Updated goals:          PLAN    [x]  Upgrade activities as tolerated YES Continue plan of care   []  Discharge due to :    []  Other:      Therapist: Gene Lombardi PT    Date: 9/14/2020 Time: 10:50 AM     Future Appointments   Date Time Provider Deanna Yanez   9/21/2020 10:30 AM Meg Tirado, PT [facility]   9/28/2020 10:30 AM Meg Tirado PT [facility]

## 2020-09-21 ENCOUNTER — APPOINTMENT (OUTPATIENT)
Dept: PHYSICAL THERAPY | Age: 80
End: 2020-09-21
Payer: MEDICARE

## 2020-09-24 ENCOUNTER — HOSPITAL ENCOUNTER (OUTPATIENT)
Dept: PHYSICAL THERAPY | Age: 80
Discharge: HOME | End: 2020-09-24
Payer: MEDICARE

## 2020-09-24 PROCEDURE — 97110 THERAPEUTIC EXERCISES: CPT

## 2020-09-24 PROCEDURE — 97140 MANUAL THERAPY 1/> REGIONS: CPT

## 2020-09-24 PROCEDURE — 97014 ELECTRIC STIMULATION THERAPY: CPT

## 2020-09-24 NOTE — PROGRESS NOTES
[address] [facility] PHYSICAL THERAPY AT [facility] [address] [place], [address] - Phone: [phone]  Fax: [phone]   [address] [facility]          Patient Name: Ga Manrique : 1940   Treatment/Medical Diagnosis: Low back pain [M54.5]   Onset Date:     Referral Source: Zach Blankenship MD Start of Care [facility]): 40   Prior Hospitalization: See Medical History Provider #: [id number]   Prior Level of Function: Symptom free, working out regularly at the gym   Comorbidities: Thyroid problems, Breast Cancer in 2013, Scoliosis   Medications: Verified on Patient Summary List   Visits from [facility]: 15 Missed Visits: 0     Previous Goals:  1. Pt will demo understanding of and compliance with DC HEP to improve symptoms. 2.  Pt will demonstrate ability to perform amb in community 30 min with 3/10 LBP. 3.  Pt will report +5 on GROC scale to indicate improved function and be prepared to DC to HEP. Prior Level/Current Level:  1) Prior Level: Pt I with initial program   Current Level: Pt with min A with progression of program adjusting, modifications made related to progress and current function. Goal Met? No, good progress  2) Prior Level: 8/10   Current Level: 3/10 with 15 min amb, 50% to goal   Goal Met? No, good progress  3) Prior Level: n/a   Current Level: +3, somewhat better   Goal Met? No, good progress    Key Functional Changes:  Pt has been seen for 4 visits since last update. Her pain level has improved to < 2-3/10 with ADLs. Her strength is improved as evidenced by ability to ascend and descend stairs with improved function, reciprocal gait pattern. Pt reports her main limitation is walking for work, ADLs. Currently she is limited to <15 min related to pain worsening with prolonged standing, walking. Symptoms have improved with LE and core strengthening, manual tx to improve postural restrictions.   Her GROC is +3 and her LE strength is improved with deficits noted with R knee ext to 3+/5, L hip abd at 4/5. Her pain increased with squatting at R knee and L lumbar region, improved with education, however, she still continues to flex and lift from movement of lumbar region vs knee flexion to ext, increasing compression at lumbar region. Problem List: pain affecting function, decrease ROM, decrease strength, decrease ADL/ functional abilitiies, decrease activity tolerance, decrease flexibility/ joint mobility and decrease transfer abilities   Treatment Plan may include any combination of the following: Therapeutic exercise, Therapeutic activities, Neuromuscular re-education, Physical agent/modality, Gait/balance training, Manual therapy, Patient education, Self Care training, Functional mobility training and Stair training  Patient Goal(s) has been updated and includes: To walk with less pain, improve strength in back and legs    Goals for this certification period include and are to be achieved in   4  weeks:  1-3. Continue as above  4. Pt will be able to perform sit<>Stand and squatting with < 3/10 pain to improve function. Frequency / Duration:   Patient to be seen   1   times per week for   4    weeks:    Assessments/Recommendations:   Pt has made good improvements in pain level since last visit. However, she continues to be unable to amb distances necessary for work and ADLs, would benefit from continued tx 1x/weekx 4 weeks. If you have any questions/comments please contact us directly at [phone]. Thank you for allowing us to assist in the care of your patient. Therapist Signature: Dinora Parkinson PT Date: 8/22/6389   Certification Period:    9/24/20-10/23/20   Time: 11:15 AM   NOTE TO PHYSICIAN:  PLEASE COMPLETE THE ORDERS BELOW AND FAX TO   [facility] at [address]: [phone].   If you are unable to process this request in 24 hours please contact our office: [phone].    ___ I have read the above report and request that my patient continue as recommended.   ___ I have read the above report and request that my patient continue therapy with the following changes/special instructions: ________________________________________________   ___ I have read the above report and request that my patient be discharged from therapy.      Physician Signature:        Date:       Time:

## 2020-09-24 NOTE — PROGRESS NOTES
PHYSICAL THERAPY - DAILY TREATMENT NOTE     Patient Name: Corrinne Chough        Date: 2020  : 1940   YES Patient  Verified  Visit #:    Insurance: Payor: Mirta  / Plan: [place] MEDICARE PART A & B / Product Type: Medicare /      In time: 3164 Out time: 1250   Total Treatment Time: 55     Medicare/BCBS Time Tracking (below)   Total Timed Codes (min):  40 1:1 Treatment Time: 40     TREATMENT AREA =  Low back pain [M54.5]    SUBJECTIVE    Pain Level (on 0 to 10 scale): 0 / 10   Medication Changes/New allergies or changes in medical history, any new surgeries or procedures? NO    If yes, update Summary List   Subjective Functional Status/Changes:  []  No changes reported     See PN/Re-cert. OBJECTIVE  Modalities Rationale:     decrease pain to improve patient's ability to perform work, amb with less pain.   15 min [x] Estim, type/location: Lumbar supine with LE elevation                                    []  att     [x]  unatt     []  w/US     []  w/ice    []  w/heat    min []  Mechanical Traction: type/lbs                   []  pro   []  sup   []  int   []  cont    []  before manual    []  after manual    min []  Ultrasound, settings/location:      min []  Iontophoresis w/ dexamethasone, location:                                               []  take home patch       []  in clinic    min []  Ice     []  Heat    location/position:     min []  Vasopneumatic Device, press/temp:     min []  Other:    [x] Skin assessment post-treatment (if applicable):    [x]  intact    []  redness- no adverse reaction     []redness  adverse reaction:      30 min Therapeutic Exercise:  [x]  See flow sheet   Rationale:      increase ROM and increase strength to improve the patients ability to perform work, amb with less pain       10 min Manual Therapy: Technique:      [x] S/DTM []IASTM []PROM [] Passive Stretching   [x]manual TPR    []Jt manipulation:Gr I [] II []  III [] IV[] V[]  Treatment Area: TPR to L QL, gluteus medius and medial hamstrings in supine   Rationale:      decrease pain, increase ROM and increase tissue extensibility to improve patient's ability to work, amb with less pain      Billed With/As:   [x] TE   [] TA   [] Neuro   [] Self Care Patient Education: [x] Review HEP    [] Progressed/Changed HEP based on:   [] positioning   [] body mechanics   [] transfers   [] heat/ice application    [] other:        Other Objective/Functional Measures:  See Re-cert. Added SLR x 10 B with TA draw, quad set in supine with emphasis on reduced hip, lumbar compensation. Post Treatment Pain Level (on 0 to 10) scale:   1 / 10     ASSESSMENT    Assessment/Changes in Function:     See Re-certification. [x]  See Progress Note/Recertification   Patient will continue to benefit from skilled PT services to modify and progress therapeutic interventions, address functional mobility deficits, address ROM deficits, address strength deficits, analyze and address soft tissue restrictions, analyze and cue movement patterns and analyze and modify body mechanics/ergonomics to attain remaining goals.       Progress toward goals / Updated goals:    See Re-certification.        PLAN    [x]  Upgrade activities as tolerated YES Continue plan of care   []  Discharge due to :    []  Other:      Therapist: Simona Orozco PT    Date: 9/24/2020 Time: 11:55 AM     Future Appointments   Date Time Provider Deanna Yanez   9/28/2020 10:30 AM Amy Maravilla PT [facility]

## 2020-09-28 ENCOUNTER — HOSPITAL ENCOUNTER (OUTPATIENT)
Dept: PHYSICAL THERAPY | Age: 80
Discharge: HOME | End: 2020-09-28
Payer: MEDICARE

## 2020-10-05 ENCOUNTER — HOSPITAL ENCOUNTER (OUTPATIENT)
Dept: PHYSICAL THERAPY | Age: 80
Discharge: HOME | End: 2020-10-05
Payer: MEDICARE

## 2020-10-05 PROCEDURE — 97140 MANUAL THERAPY 1/> REGIONS: CPT

## 2020-10-05 PROCEDURE — 97014 ELECTRIC STIMULATION THERAPY: CPT

## 2020-10-05 PROCEDURE — 97110 THERAPEUTIC EXERCISES: CPT

## 2020-10-05 NOTE — PROGRESS NOTES
PHYSICAL THERAPY - DAILY TREATMENT NOTE     Patient Name: Gino Campoverde        Date: 10/5/2020  : 1940   YES Patient  Verified  Visit #:     Insurance: Payor: Concetta Diaz / Plan: [place] MEDICARE PART A & B / Product Type: Medicare /      In time: 9156 Out time: 130   Total Treatment Time: 60     Medicare/BCBS Time Tracking (below)   Total Timed Codes (min):  45 1:1 Treatment Time:  45     TREATMENT AREA =  Low back pain [M54.5]    SUBJECTIVE    Pain Level (on 0 to 10 scale):  / 10   Medication Changes/New allergies or changes in medical history, any new surgeries or procedures? NO    If yes, update Summary List   Subjective Functional Status/Changes:  []  No changes reported       Pt is reporting she is not doing squats but is doing leg lifts as directed. Noting less pain in her lower back since doing more SLR and quadriceps strengthening from last visit.        OBJECTIVE  Modalities Rationale:     decrease pain to improve patient's ability to perform work, ADLs with less pain     15 min [x] Estim, type/location:  Lumbar region in supine                                     []  att     [x]  unatt     []  w/US     []  w/ice    []  w/heat    min []  Mechanical Traction: type/lbs                   []  pro   []  sup   []  int   []  cont    []  before manual    []  after manual    min []  Ultrasound, settings/location:      min []  Iontophoresis w/ dexamethasone, location:                                               []  take home patch       []  in clinic    min []  Ice     []  Heat    location/position:     min []  Vasopneumatic Device, press/temp:     min []  Other:    [x] Skin assessment post-treatment (if applicable):    [x]  intact    []  redness- no adverse reaction     []redness  adverse reaction:      35 min Therapeutic Exercise:  [x]  See flow sheet   Rationale:      increase ROM and increase strength to improve the patients ability to perform ADLs, work          10 min Manual Therapy: Technique:      [x] S/DTM []IASTM []PROM [] Passive Stretching   []manual TPR    []Jt manipulation:Gr I [] II []  III [] IV[] V[]  Treatment Area:  L STM to lumbar paraspinals, QL in S/L   Rationale:      decrease pain and increase ROM to improve patient's ability to perform work, ADLs with less pain      Billed With/As:   [x] TE   [] TA   [] Neuro   [] Self Care Patient Education: [x] Review HEP    [] Progressed/Changed HEP based on:   [] positioning   [] body mechanics   [] transfers   [] heat/ice application    [x] other: HEP progression       Other Objective/Functional Measures:    Reviewed SLR and squats with ed on reduced compression for lumbar region with hip hinging. Added QS with TA draw supine with tactile and verbal cuing. Post Treatment Pain Level (on 0 to 10) scale:   1  / 10     ASSESSMENT    Assessment/Changes in Function:     Pt with pain during squats, modified SLR to hold time and no quad lag this day after ed. Pt responded well with TA draw ed to control pelvic movement during SLR. Bent over rows with increased reps and cuing for form to reduce trunk flexion. []  See Progress Note/Recertification   Patient will continue to benefit from skilled PT services to modify and progress therapeutic interventions, address functional mobility deficits, address ROM deficits, address strength deficits, analyze and address soft tissue restrictions, analyze and cue movement patterns and analyze and modify body mechanics/ergonomics to attain remaining goals. Progress toward goals / Updated goals:    4. Pt will be able to perform sit<>Stand and squatting with < 3/10 pain to improve function--good progress, currently at 4-5/10.      PLAN    [x]  Upgrade activities as tolerated YES Continue plan of care   []  Discharge due to :    []  Other:      Therapist: Davion Rolon PT    Date: 10/5/2020 Time: 2:14 pm     Future Appointments   Date Time Provider Deanna Yanez   10/5/2020 12:30 PM [facility]   10/14/2020 10:30 AM Basil Number, PT [facility]   10/19/2020 11:00 AM Basil Number, PT [facility]   10/26/2020 11:00 AM Basil Number, PT [facility]

## 2020-10-14 ENCOUNTER — HOSPITAL ENCOUNTER (OUTPATIENT)
Dept: PHYSICAL THERAPY | Age: 80
Discharge: HOME | End: 2020-10-14
Payer: MEDICARE

## 2020-10-14 PROCEDURE — 97110 THERAPEUTIC EXERCISES: CPT

## 2020-10-14 PROCEDURE — 97014 ELECTRIC STIMULATION THERAPY: CPT

## 2020-10-14 NOTE — PROGRESS NOTES
PHYSICAL THERAPY - DAILY TREATMENT NOTE     Patient Name: Olga Dempsey        Date: 10/14/2020  : 1940   YES Patient  Verified  Visit #:     Insurance: Payor: Haven Amen / Plan: [place] MEDICARE PART A & B / Product Type: Medicare /      In time:  Out time: 125   Total Treatment Time: 45     Medicare/BCBS Time Tracking (below)   Total Timed Codes (min):  30 1:1 Treatment Time:  30     TREATMENT AREA =  Low back pain [M54.5]    SUBJECTIVE    Pain Level (on 0 to 10 scale): 2 / 10   Medication Changes/New allergies or changes in medical history, any new surgeries or procedures? NO    If yes, update Summary List   Subjective Functional Status/Changes:  []  No changes reported     Improving pain but is still limited in strength for B LE and back. Noting pain is still significant with amb, standing. OBJECTIVE  Modalities Rationale:     decrease pain to improve patient's ability to perform work, ADLs with less pain     15 min [x] Estim, type/location:  Lumbar region in supine                                     []  att     [x]  unatt     []  w/US     []  w/ice    []  w/heat    min []  Mechanical Traction: type/lbs                   []  pro   []  sup   []  int   []  cont    []  before manual    []  after manual    min []  Ultrasound, settings/location:      min []  Iontophoresis w/ dexamethasone, location:                                               []  take home patch       []  in clinic    min []  Ice     []  Heat    location/position:     min []  Vasopneumatic Device, press/temp:     min []  Other:    [x] Skin assessment post-treatment (if applicable):    [x]  intact    []  redness- no adverse reaction     []redness  adverse reaction:      30 min Therapeutic Exercise:  [x]  See flow sheet   Rationale:      increase ROM and increase strength to improve the patients ability to perform ADLs, work with less pain.       Billed With/As:   [x] TE   [] TA   [] Neuro   [] Self Care Patient Education: [x] Review HEP    [] Progressed/Changed HEP based on:   [] positioning   [] body mechanics   [] transfers   [] heat/ice application    [x] other: HEP progression, use of resistance for LE strengthening       Other Objective/Functional Measures:    QS progression to SLR with hold. Pt with no pain during SLR this day B. Pain with squatting. Post Treatment Pain Level (on 0 to 10) scale:   0-1 / 10     ASSESSMENT    Assessment/Changes in Function:     Pt with good progress overall, continues to be limited in LE strength, ed on progression with SLR B and hold, TA draw to reduce forces into lumbar region. []  See Progress Note/Recertification   Patient will continue to benefit from skilled PT services to modify and progress therapeutic interventions, address functional mobility deficits, address ROM deficits, address strength deficits, analyze and address soft tissue restrictions, analyze and cue movement patterns and analyze and modify body mechanics/ergonomics to attain remaining goals. Progress toward goals / Updated goals:    4. Pt will be able to perform sit<>stand and squatting with < 3/10 pain to improve function--good progress, currently at 4/10 with strength gains noted. PLAN    [x]  Upgrade activities as tolerated YES Continue plan of care   []  Discharge due to :    []  Other: Good progress overall, reduced frequency to next visit ~ 2 weeks PRN.      Therapist: Kelly Romo, PT    Date: 10/14/2020 Time: 12:34 pm     Future Appointments   Date Time Provider Deanna Yanez   10/26/2020 11:00 AM Julián Wayne, PT [facility]

## 2020-10-19 ENCOUNTER — APPOINTMENT (OUTPATIENT)
Dept: PHYSICAL THERAPY | Age: 80
End: 2020-10-19
Payer: MEDICARE

## 2020-10-26 ENCOUNTER — HOSPITAL ENCOUNTER (OUTPATIENT)
Dept: PHYSICAL THERAPY | Age: 80
Discharge: HOME | End: 2020-10-26
Payer: MEDICARE

## 2020-10-26 PROCEDURE — 97110 THERAPEUTIC EXERCISES: CPT

## 2020-10-26 PROCEDURE — 97140 MANUAL THERAPY 1/> REGIONS: CPT

## 2020-10-26 PROCEDURE — 97014 ELECTRIC STIMULATION THERAPY: CPT

## 2020-11-11 ENCOUNTER — HOSPITAL ENCOUNTER (OUTPATIENT)
Dept: PHYSICAL THERAPY | Age: 80
Discharge: HOME | End: 2020-11-11
Payer: MEDICARE

## 2020-11-11 PROCEDURE — 97014 ELECTRIC STIMULATION THERAPY: CPT

## 2020-11-11 PROCEDURE — 97110 THERAPEUTIC EXERCISES: CPT

## 2020-11-11 NOTE — PROGRESS NOTES
PHYSICAL THERAPY - DAILY TREATMENT NOTE     Patient Name: Arabella Liang        Date: 2020  : 1940   YES Patient  Verified  Visit #:  23*   of   24  Insurance: Payor: Noni Ely / Plan: [place] MEDICARE PART A & B / Product Type: Medicare /      In time: 5500 Out time: 12   Total Treatment Time: 45     Medicare/BCBS Time Tracking (below)   Total Timed Codes (min):  35 1:1 Treatment Time:  35     TREATMENT AREA =  Low back pain [M54.5]    SUBJECTIVE    Pain Level (on 0 to 10 scale):  5/ 10   Medication Changes/New allergies or changes in medical history, any new surgeries or procedures? NO    If yes, update Summary List   Subjective Functional Status/Changes:  []  No changes reported   See Re-certification. OBJECTIVE  Modalities Rationale:     decrease pain to improve patient's ability to perform work with less pain   10 min [x] Estim, type/location: IFC to lumbar region in supine                                     []  att     [x]  unatt     []  w/US     []  w/ice    []  w/heat    min []  Mechanical Traction: type/lbs                   []  pro   []  sup   []  int   []  cont    []  before manual    []  after manual    min []  Ultrasound, settings/location:      min []  Iontophoresis w/ dexamethasone, location:                                               []  take home patch       []  in clinic    min []  Ice     []  Heat    location/position:     min []  Vasopneumatic Device, press/temp:     min []  Other:    [] Skin assessment post-treatment (if applicable):    []  intact    []  redness- no adverse reaction     []redness - adverse reaction:      35 min Therapeutic Exercise:  [x]  See flow sheet   Rationale:      increase ROM and increase strength to improve the patients ability to perform ADLs, work with less pain.           Billed With/As:   [x] TE   [] TA   [] Neuro   [] Self Care Patient Education: [x] Review HEP    [] Progressed/Changed HEP based on:   [] positioning   [] body mechanics [] transfers   [] heat/ice application    [] other:        Other Objective/Functional Measures:  See Re-cert. Post Treatment Pain Level (on 0 to 10) scale:   2 / 10     ASSESSMENT    Assessment/Changes in Function:     See Re-cert. []  See Progress Note/Recertification   Patient will continue to benefit from skilled PT services to modify and progress therapeutic interventions, address functional mobility deficits, address ROM deficits, address strength deficits and analyze and address soft tissue restrictions to attain remaining goals. Progress toward goals / Updated goals:    Pt with good progress toward LTGs as updated. Will be seen PRN x 1 visit in next 30 days. PLAN    [x]  Upgrade activities as tolerated YES Continue plan of care   []  Discharge due to :    []  Other:      Therapist: Solomon Lema PT    Date: 11/11/2020 Time: 11:34 AM   No future appointments.

## 2020-11-12 NOTE — PROGRESS NOTES
201 [facility] PHYSICAL THERAPY AT [facility] [address] [place], [address] - Phone: [phone]  Fax: [phone]   [employer]          Patient Name: David Sood : 1940   Treatment/Medical Diagnosis: Low back pain [M54.5]   Onset Date:     Referral Source: Silviano Zabala MD [place] [facility]): 40   Prior Hospitalization: See Medical History Provider #: [id number]   Prior Level of Function: Symptom free, working out regularly at the gym   Comorbidities: Thyroid problems, Breast Cancer in 2013, Scoliosis   Medications: Verified on Patient Summary List   Visits from [facility]: 22 Missed Visits: 0     Previous Goals:  1. Pt will demo understanding of and compliance with DC HEP to improve symptoms. 2.  Pt will demonstrate ability to perform amb in community 30 min with 3/10 LBP. 3.  Pt will report +5 on GROC scale to indicate improved function and be prepared to DC to HEP. 4. Pt will be able to perform sit<>Stand and squatting with < 3/10 pain to improve function. Prior Level/Current Level:  1) Prior Level:Pt with min A with progression of program adjusting, modifications made related to progress and current function. Current Level: Pt I with detailed program and progression of activity within Dx restrictions   Goal Met? yes  2) Prior Level:3/10 with 15 min amb, 50% to goal   Current Level: 5/10 with 20 min amb, good progress   Goal Met? No, good progress  3) Prior Level: +3   Current Level: +5    Goal Met? yes  4) Prior Level: Pain with sit<>stand at 6/10   Current Level: Pain with sit<>stand at <2/10, squatting with pain > 3/10   Goal Met? No, good progress      Key Functional Changes:  Pt has been seen for 2 visits since last update. Pt reports her main limitation is walking for work, ADLs.    Symptoms have improved with LE and core strengthening, modalities to manage LBP and manual tx to improve postural restrictions. Her GROC is +5 and her LE strength is improved with deficits noted with R knee ext to 4/5 from 3+/5 and L hip abd at 4+/5 from 4/5. Her pain is increased with squatting and amb for ADLs, work activity restricting her access to perform normal work activity. Problem List: pain affecting function, decrease ROM, decrease strength, decrease ADL/ functional abilitiies, decrease activity tolerance, decrease flexibility/ joint mobility and decrease transfer abilities   Treatment Plan may include any combination of the following: Therapeutic exercise, Therapeutic activities, Neuromuscular re-education, Physical agent/modality, Gait/balance training, Manual therapy, Patient education, Self Care training, Functional mobility training and Stair training  Patient Goal(s) has been updated and includes: To walk with less pain    Goals for this certification period include and are to be achieved in   4  weeks:  2. Pt will demonstrate ability to perform amb in community 30 min with 3/10 LBP. 4. Pt will be able to perform sit<>Stand and squatting with < 3/10 pain to improve function. Frequency / Duration:   Patient to be seen   1   times per week every other week x 2 visits:    Assessments/Recommendations:   Pt has made good improvements in pain level since last visit. She has been educated and demonstrated compliance with a comprehensive LE and core strengthening program to be performed for HEP. However, she continues to be unable to amb distances necessary for work and ADLs, would benefit from 2 visits to address limitations in function as she transitions to self management of symptoms with HEP. If you have any questions/comments please contact us directly at [phone]. Thank you for allowing us to assist in the care of your patient.     Therapist Signature: Melissa Jerry PT Date: 43/65/4517   Certification Period:    10/26/20-11/25/20   Time: 11AM   NOTE TO PHYSICIAN:  PLEASE COMPLETE THE ORDERS BELOW AND FAX TO   [facility] Physical Therapy at [place]: [phone]. If you are unable to process this request in 24 hours please contact our office: [phone].    ___ I have read the above report and request that my patient continue as recommended.   ___ I have read the above report and request that my patient continue therapy with the following changes/special instructions: ________________________________________________   ___ I have read the above report and request that my patient be discharged from therapy.      Physician Signature:        Date:       Time:

## 2021-03-24 ENCOUNTER — HOSPITAL ENCOUNTER (OUTPATIENT)
Dept: PHYSICAL THERAPY | Age: 81
Discharge: HOME | End: 2021-03-24
Payer: MEDICARE

## 2021-03-24 PROCEDURE — 97161 PT EVAL LOW COMPLEX 20 MIN: CPT

## 2021-03-24 PROCEDURE — 97110 THERAPEUTIC EXERCISES: CPT

## 2021-03-24 NOTE — PROGRESS NOTES
[address] [facility] PHYSICAL THERAPY AT [facility] [address]. [place], [address] - Phone: [phone]  Fax: [phone] / 0[facility]  Patient Name: Anushka Cartagena : 1940   Medical   Diagnosis: Low back pain [M54.5] Treatment Diagnosis: LBP, lumbar radiculopathy   Onset Date: 3/10/21     Referral Source: Car Gresham MD Start of Care [facility]): 3/24/2021   Prior Hospitalization: See medical history Provider #: [id number]   Prior Level of Function: Walking 45 min every other day, rowing 2x/week with < 2/10 pain   Comorbidities: See medical history   Medications: Verified on Patient Summary List   The Plan of Care and following information is based on the information from the initial evaluation.   ===================================================================  Assessment / key information:  Pt is a [de-identified] yo female who presents with signs and symptoms consistent with lumbar radiculopathy. MESHA/HPI:Pt states she noted symptoms after amb for work x 2-3 weeks prior. Pt reports location of pain as ~ L3-L5, current pain level 5/10, pain level at worst 8/10, and pain level at best 0/10 at rest. Aggravating factors: Increased seated duration > 15 min and amb > 10 min. Relieving factors: rest. 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 CLoF: Pt unable to perform normal work activity, pain after 2 hours of work limiting her ability to perform necessary duties. Objective: Posture: SB to L with scoliotic curvature noted, AROM/PROM: painful and dysfunctional lumbar flexion, rotation and SB B. Hip ext and abd at 3+/5 B. Special Tests:Pt with SLS B at 5 sec and trunk flexion with sit<>Stand. Pt would benefit from skilled PT intervention in order to improve upon previously mentioned subjective/objective impairments. ===========================================================================================  Eval Complexity: History MEDIUM  Complexity : 1-2 comorbidities / personal factors will impact the outcome/ POC ;  Examination  LOW Complexity : 1-2 Standardized tests and measures addressing body structure, function, activity limitation and / or participation in recreation ; Presentation LOW Complexity : Stable, uncomplicated ;  Decision Making MEDIUM Complexity : FOTO score of 26-74; Overall Complexity LOW   Problem List: pain affecting function, decrease ROM, decrease strength, decrease ADL/ functional abilitiies, decrease activity tolerance, decrease flexibility/ joint mobility and decrease transfer abilities   Treatment Plan may include any combination of the following: Therapeutic exercise, Therapeutic activities, Neuromuscular re-education, Physical agent/modality, Gait/balance training, Manual therapy, Patient education, Self Care training and Functional mobility training  Patient / Family readiness to learn indicated by: asking questions, trying to perform skills and interest  Persons(s) to be included in education: patient (P)  Barriers to Learning/Limitations: None  Measures taken, if barriers to learning    Patient Goal (s): To walk and work with less pain   Patient self reported health status: good  Rehabilitation Potential: good   Short Term Goals: To be accomplished in  4  weeks:  1. Establish initial HEP and pt compliant with instructions  2. Pt will demonstrate ability to work x 4 hours with < 2/10 LBP to improve function.  Long Term Goals: To be accomplished in  8  weeks:  1. Increase FOTO score by 5 points indicating improved function  2. Pt will demonstrate ability to work x 8 hours with < 2/10 LBP to improve function. 3. Pt will be I with advanced HEP for long term management of symptoms  4.  Pt will be able to demonstrate sit<>Stand and amb x 30 min with < 2/10 pain  Frequency / Duration:   Patient to be seen  1-2  times per week for 8  weeks:  Patient / Caregiver education and instruction: self care, activity modification and exercises  Therapist Signature: Lokesh Feng PT Date: 3/26/3748   Certification Period: 3/24/21-6/23/21 Time: 4:13 PM   ===========================================================================================  I certify that the above Physical Therapy Services are being furnished while the patient is under my care. I agree with the treatment plan and certify that this therapy is necessary. Physician Signature:      Date:       Time:     Aimee Yuan MD    Please sign and return to [facility] or you may fax the signed copy to [phone]. Thank you.

## 2021-03-24 NOTE — PROGRESS NOTES
PHYSICAL THERAPY - DAILY TREATMENT NOTE    Patient Name: Joe Benz        Date: 3/24/2021  : 1940   YES Patient  Verified  Visit #:   1   of  8  Insurance: Payor: [place] MEDICARE / Plan: [place] MEDICARE PART A & B / Product Type: Medicare /      In time: 400 Out time: 500   Total Treatment Time: 60     Medicare Time Tracking (below)   Total Timed Codes (min):  15 1:1 Treatment Time:  15     TREATMENT AREA =  Low back pain [M54.5]    SUBJECTIVE                                                         See IE             OBJECTIVE    15 min Therapeutic Exercise:  [x]  See flow sheet   Rationale:      increase ROM and increase strength to improve the patients ability to perform ADLs, amb with less pain       Billed With/As:   [x] TE   [] TA   [] Neuro   [] Self Care Patient Education: [x] Review HEP    [] Progressed/Changed HEP based on:   [] positioning   [] body mechanics   [] transfers   [] heat/ice application    [] other:        thoroughout evaluation min Patient Education:  YES  Reviewed HEP   [x] A and P related to current DX [x] LTGs and POC     Other Objective/Functional Measures:                                   See IE       ASSESSMENT    [x]  See Initial Evaluation     PLAN    []  Upgrade activities as tolerated YES Continue plan of care   []  Discharge due to :    []  Other: Pt will return for follow up and to initiate POC     Therapist: Mary Marina PT, Darcie CARIAS 62    Date: 3/24/2021 Time: 4:07 PM

## 2021-03-24 NOTE — PROGRESS NOTES
[address] [facility] PHYSICAL THERAPY AT [facility] [address] [place], [address] - Phone: [phone]  Fax: [phone]  DISCHARGE SUMMARY FOR PHYSICAL THERAPY          Patient Name: Kendall Musa : 1940   Treatment/Medical Diagnosis: Low back pain [M54.5]   Onset Date:     Referral Source: Joann Tyson MD [facility] of [facility]): 20   Prior Hospitalization: See Medical History Provider #: [id number]   Prior Level of Function: Symptom free, working out regularly at the gym   Comorbidities: Thyroid problems, Breast Cancer in 2013, Scoliosis   Medications: Verified on Patient Summary List   [facility]: 24 Missed Visits: 0     Previous Goals:  2. Pt will demonstrate ability to perform amb in community 30 min with 3/10 LBP. 4. Pt will be able to perform sit<>Stand and squatting with < 3/10 pain to improve function. Prior Level/Current Level:  1)  Prior Level: 5/10 with 20 min amb, 50% to goal   Current Level: 3/10 with 20 min amb   Goal Met? Yes  4) Prior Level:Pain with sit<>stand at <2/10, squatting with pain > 3/10   Current Level: Pain with sit<>stand at <2/10, squatting with pain > 2/10   Goal Met? Yes      Key Functional Changes:  Pt has been seen for 1 visit since last update and has met all LTGs. Pt reports her she has seen improvements in her walking distances for work, ADLs. She stretches before and after amb, performing HEP daily for core strength and stretching. Her GROC is +5 and her LE strength is improved with deficits noted with R knee ext to 4+/5 from 3+/5 and L hip abd at 4+/5 from 4/5. Her pain is reduced with squatting and amb for ADLs, work activity with pt returning gradually to modified prior level of status. Assessments/Recommendations: D/C to HEP with follow up with MD PRN. If you have any questions/comments please contact us directly at [phone].    Thank you for allowing us to assist in the care of your patient.     Therapist Signature: Randa Hurtado, PT Date: 48/56/75   Certification Period:  Reporting Period:  n/a  2/2/20-11/11/20 with hold between 3/13/20-7/14/20 Time: 11AM

## 2021-03-31 ENCOUNTER — HOSPITAL ENCOUNTER (OUTPATIENT)
Dept: PHYSICAL THERAPY | Age: 81
Discharge: HOME | End: 2021-03-31
Payer: MEDICARE

## 2021-03-31 PROCEDURE — 97110 THERAPEUTIC EXERCISES: CPT

## 2021-03-31 PROCEDURE — 97140 MANUAL THERAPY 1/> REGIONS: CPT

## 2021-03-31 NOTE — PROGRESS NOTES
PHYSICAL THERAPY - DAILY TREATMENT NOTE     Patient Name: Mariana Call        Date: 3/31/2021  : 1940   YES Patient  Verified  Visit #:   2   of   8  Insurance: Payor: Mariia Cason / Plan: [place] MEDICARE PART A & B / Product Type: Medicare /      In time: 350 Out time: 430   Total Treatment Time: 40     Medicare/BCBS Time Tracking (below)   Total Timed Codes (min):  40 1:1 Treatment Time:  40     TREATMENT AREA =  Low back pain [M54.5]    SUBJECTIVE    Pain Level (on 0 to 10 scale): 3/ 10   Medication Changes/New allergies or changes in medical history, any new surgeries or procedures? NO    If yes, update Summary List   Subjective Functional Status/Changes:  []  No changes reported       Pt reports she has had less pain since last visit. Pt with posture brace assessment request, she has worn it in today. Hoping it will help with her back pain. OBJECTIVE  30 min Therapeutic Exercise:  [x]  See flow sheet   Rationale:      increase ROM and increase strength to improve the patients ability to perform LE ADLs    10 min Manual Therapy: Technique:      [] S/DTM []IASTM []PROM [] Passive Stretching   []manual TPR    []Jt manipulation:Gr I [] II []  III [] IV[] V[]  Treatment Area:     Rationale:      decrease pain and increase postural awareness to improve patient's ability to work, LE ADLs with less pain  The manual therapy interventions were performed at a separate and distinct time from the therapeutic activities interventions. Billed With/As:   [x] TE   [] TA   [] Neuro   [] Self Care Patient Education: [x] Review HEP    [] Progressed/Changed HEP based on:   [] positioning   [] body mechanics   [] transfers   [] heat/ice application    [] other:        Other Objective/Functional Measures:    Pt posture brace assessed with good fit noted.      Post Treatment Pain Level (on 0 to 10) scale:  1-2  / 10     ASSESSMENT    Assessment/Changes in Function:     Good response to hip flexion stretch and ER stretch to HEP. []  See Progress Note/Recertification   Patient will continue to benefit from skilled PT services to modify and progress therapeutic interventions, address functional mobility deficits, address ROM deficits, and address strength deficits to attain remaining goals. Progress toward goals / Updated goals:  Good progress toward recently established goals.      PLAN    [x]  Upgrade activities as tolerated YES Continue plan of care   []  Discharge due to :    []  Other:      Therapist: Lidia Urbina PT    Date: 3/31/2021 Time: 10:53 AM     Future Appointments   Date Time Provider Deanna Yanez   3/31/2021  3:45 PM Sal Bitters [facility]   4/12/2021  3:30 PM Jack Stanford, PT [facility]   4/19/2021  4:30 PM Jack Stanford, PT [facility]   4/28/2021  3:45 PM Jack Stanford, PT [facility]

## 2021-04-12 ENCOUNTER — HOSPITAL ENCOUNTER (OUTPATIENT)
Dept: PHYSICAL THERAPY | Age: 81
Discharge: HOME | End: 2021-04-12
Payer: MEDICARE

## 2021-04-12 PROCEDURE — 97140 MANUAL THERAPY 1/> REGIONS: CPT

## 2021-04-12 PROCEDURE — 97110 THERAPEUTIC EXERCISES: CPT

## 2021-04-12 NOTE — PROGRESS NOTES
PHYSICAL THERAPY - DAILY TREATMENT NOTE     Patient Name: Kendy Delarosa        Date: 2021  : 1940   YES Patient  Verified  Visit #:  3/ 8  Insurance: Payor: Mitchel Muse / Plan: [place] MEDICARE PART A & B / Product Type: Medicare /      In time: 335 Out time: 415   Total Treatment Time: 40     Medicare/BCBS Time Tracking (below)   Total Timed Codes (min):  40 1:1 Treatment Time:  40     TREATMENT AREA =  Low back pain [M54.5]    SUBJECTIVE    Pain Level (on 0 to 10 scale): 5/ 10   Medication Changes/New allergies or changes in medical history, any new surgeries or procedures? NO    If yes, update Summary List   Subjective Functional Status/Changes:  []  No changes reported   Pt was in more pain this weekend and over the  break with riding bike. Walking has not triggered her pain as greatly, up to 15-20 min walk with manageable pain level. OBJECTIVE    30 min Therapeutic Exercise:  [x]  See flow sheet   Rationale:      increase ROM and increase strength to improve the patients ability to perform LE ADLs with less pain    10 min Manual Therapy: Technique:      [x] S/DTM []IASTM []PROM [] Passive Stretching   [x]manual TPR    [x]Jt manipulation:Gr I [] II []  III [] IV[] V[]  Treatment Area:  STM L gluteals, piriformis   Rationale:      decrease pain and increase postural awareness to improve patient's ability to work, LE ADLs with less pain  The manual therapy interventions were performed at a separate and distinct time from the therapeutic activities interventions. Billed With/As:   [x] TE   [] TA   [] Neuro   [] Self Care Patient Education: [x] Review HEP    [] Progressed/Changed HEP based on:   [] positioning   [] body mechanics   [] transfers   [] heat/ice application    [] other:        Other Objective/Functional Measures:    Pt with TTP gluteals L, symptoms to left lateral knee.    Post Treatment Pain Level (on 0 to 10) scale:  3  / 10     ASSESSMENT    Assessment/Changes in Function: Pt responded well to flexion protocol to reduce symptoms into LE, centralized symptoms to lumbar.     []  See Progress Note/Recertification   Patient will continue to benefit from skilled PT services to modify and progress therapeutic interventions, address functional mobility deficits, address ROM deficits, and address strength deficits to attain remaining goals. Progress toward goals / Updated goals:  Limited progress toward recently established goals with change in activity and increased pain level.      PLAN    [x]  Upgrade activities as tolerated YES Continue plan of care   []  Discharge due to :    []  Other:      Therapist: Cristal Clarke PT    Date: 4/12/2021 Time: 5pm     Future Appointments   Date Time Provider Deanna Yanez   4/12/2021  3:30 PM Enrigue Leap, PT [facility]   4/19/2021  2:15 PM Enrigue Leap, PT [facility]   4/30/2021  1:15 PM Enrigue Leap, PT [facility]

## 2021-04-19 ENCOUNTER — HOSPITAL ENCOUNTER (OUTPATIENT)
Dept: PHYSICAL THERAPY | Age: 81
Discharge: HOME | End: 2021-04-19
Payer: MEDICARE

## 2021-04-19 PROCEDURE — 97140 MANUAL THERAPY 1/> REGIONS: CPT

## 2021-04-19 PROCEDURE — 97110 THERAPEUTIC EXERCISES: CPT

## 2021-04-19 PROCEDURE — 97112 NEUROMUSCULAR REEDUCATION: CPT

## 2021-04-28 ENCOUNTER — APPOINTMENT (OUTPATIENT)
Dept: PHYSICAL THERAPY | Age: 81
End: 2021-04-28
Payer: MEDICARE

## 2021-04-30 ENCOUNTER — APPOINTMENT (OUTPATIENT)
Dept: PHYSICAL THERAPY | Age: 81
End: 2021-04-30
Payer: MEDICARE

## 2021-05-04 ENCOUNTER — APPOINTMENT (OUTPATIENT)
Dept: PHYSICAL THERAPY | Age: 81
End: 2021-05-04

## 2021-05-07 ENCOUNTER — APPOINTMENT (OUTPATIENT)
Dept: PHYSICAL THERAPY | Age: 81
End: 2021-05-07

## 2021-05-17 ENCOUNTER — APPOINTMENT (OUTPATIENT)
Dept: PHYSICAL THERAPY | Age: 81
End: 2021-05-17

## 2021-09-03 NOTE — PROGRESS NOTES
[address] [place] [address] PHYSICAL THERAPY AT [facility] [address] [place] - Phone: [phone]  Fax: [phone]  DISCHARGE SUMMARY FOR PHYSICAL THERAPY          Patient Name: Yousuf Mccallum : 1940   Treatment/Medical Diagnosis: LBP   Onset Date: 3/10/21    Referral Source: Fadumo Tavarez MD [facility]): 3/24/21   Prior Hospitalization: See Medical History Provider #: [id number]   Prior Level of Function: Walking 45 min every other day, rowing 2x/week with < 2/10 pain   Comorbidities: Thyroid problems, Breast Cancer in 2013, Scoliosis   Medications: Verified on Patient Summary List   Visits from [facility]: 4 Missed Visits: 0     Previous Goals:  1. Increase FOTO score by 5 points indicating improved function  2. Pt will demonstrate ability to work x 8 hours with < 2/10 LBP to improve function. 3. Pt will be I with advanced HEP for long term management of symptoms  4. Pt will be able to demonstrate sit<>Stand and amb x 30 min with < 2/10 pain   Prior Level/Current Level:  Pt has not returned for follow up, unable to formally reassess. Key Functional Changes: At time of last visit, pt was making good progress towards goals. She had returned to modified gym program. She has not returned for follow up and will be D/C. Assessments/Recommendations: D/C to HEP with follow up with MD PRN. If you have any questions/comments please contact us directly at [phone]. Thank you for allowing us to assist in the care of your patient.     Therapist Signature: Amber Mtz PT Date: 77   Certification Period:  Reporting Period:  n/a  3/24/21-21 Time: 11AM

## 2023-08-14 NOTE — PATIENT INSTRUCTIONS
Atypical Chest Pain >> arrange nuclear stress test with Dr. Keely Pisano Cholesterol elevation>>statin declined GERD>>Omeprazole Osteopenia>>AVOID Calcium Supplements and get calcium from low fat dairy or almond milk or orange juice  
+ exercise Cologard will be submitted DEXA through Dr. Deloris Vickers Vital Signs Last 24 Hrs  T(C): 36.7 (08-14-23 @ 08:18), Max: 36.7 (08-14-23 @ 08:18)  T(F): 98.1 (08-14-23 @ 08:18), Max: 98.1 (08-14-23 @ 08:18)  HR: --  BP: --  BP(mean): --  RR: --  SpO2: --    Orthostatic VS  08-14-23 @ 08:18  Lying BP: --/-- HR: --  Sitting BP: 96/67 HR: 51  Standing BP: --/-- HR: --  Site: --  Mode: --  Orthostatic VS  08-13-23 @ 20:16  Lying BP: --/-- HR: --  Sitting BP: 117/73 HR: 85  Standing BP: 112/78 HR: 92  Site: --  Mode: --  Orthostatic VS  08-13-23 @ 08:45  Lying BP: --/-- HR: --  Sitting BP: 109/74 HR: 58  Standing BP: 110/83 HR: 65  Site: upper right arm  Mode: electronic  Orthostatic VS  08-12-23 @ 19:05  Lying BP: --/-- HR: --  Sitting BP: 98/77 HR: 68  Standing BP: 102/80 HR: 72  Site: --  Mode: --   Vital Signs Last 24 Hrs  T(C): 36.7 (08-14-23 @ 08:18), Max: 36.7 (08-14-23 @ 08:18)  T(F): 98.1 (08-14-23 @ 08:18), Max: 98.1 (08-14-23 @ 08:18)  HR: --  BP: --  BP(mean): --  RR: --  SpO2: --    Orthostatic VS  08-14-23 @ 14:30  Lying BP: --/-- HR: --  Sitting BP: 96/60 HR: 84  Standing BP: --/-- HR: --  Site: --  Mode: auscultated w/ stethoscope  Orthostatic VS  08-14-23 @ 08:18  Lying BP: --/-- HR: --  Sitting BP: 96/67 HR: 51  Standing BP: --/-- HR: --  Site: --  Mode: --  Orthostatic VS  08-13-23 @ 20:16  Lying BP: --/-- HR: --  Sitting BP: 117/73 HR: 85  Standing BP: 112/78 HR: 92  Site: --  Mode: --  Orthostatic VS  08-13-23 @ 08:45  Lying BP: --/-- HR: --  Sitting BP: 109/74 HR: 58  Standing BP: 110/83 HR: 65  Site: upper right arm  Mode: electronic  Orthostatic VS  08-12-23 @ 19:05  Lying BP: --/-- HR: --  Sitting BP: 98/77 HR: 68  Standing BP: 102/80 HR: 72  Site: --  Mode: --   Vital Signs Last 24 Hrs  T(C): 36.8 (08-15-23 @ 08:13), Max: 36.8 (08-14-23 @ 18:38)  T(F): 98.3 (08-15-23 @ 08:13), Max: 98.3 (08-15-23 @ 08:13)  HR: --  BP: --  BP(mean): --  RR: --  SpO2: --    Orthostatic VS  08-15-23 @ 08:13  Lying BP: --/-- HR: --  Sitting BP: 109/80 HR: 82  Standing BP: --/-- HR: --  Site: --  Mode: --  Orthostatic VS  08-14-23 @ 18:38  Lying BP: --/-- HR: --  Sitting BP: 113/64 HR: 84  Standing BP: --/-- HR: --  Site: --  Mode: --  Orthostatic VS  08-14-23 @ 14:30  Lying BP: --/-- HR: --  Sitting BP: 96/60 HR: 84  Standing BP: --/-- HR: --  Site: --  Mode: auscultated w/ stethoscope  Orthostatic VS  08-14-23 @ 08:18  Lying BP: --/-- HR: --  Sitting BP: 96/67 HR: 51  Standing BP: --/-- HR: --  Site: --  Mode: --  Orthostatic VS  08-13-23 @ 20:16  Lying BP: --/-- HR: --  Sitting BP: 117/73 HR: 85  Standing BP: 112/78 HR: 92  Site: --  Mode: --